# Patient Record
Sex: FEMALE | Race: WHITE | Employment: OTHER | URBAN - NONMETROPOLITAN AREA
[De-identification: names, ages, dates, MRNs, and addresses within clinical notes are randomized per-mention and may not be internally consistent; named-entity substitution may affect disease eponyms.]

---

## 2018-08-15 ENCOUNTER — HOSPITAL ENCOUNTER (OUTPATIENT)
Dept: NURSING | Age: 53
Discharge: HOME | End: 2018-08-15
Payer: MEDICARE

## 2018-08-15 ENCOUNTER — HOSPITAL ENCOUNTER (OUTPATIENT)
Dept: ULTRASOUND IMAGING | Age: 53
Discharge: HOME | End: 2018-08-15
Payer: MEDICARE

## 2018-08-15 VITALS
RESPIRATION RATE: 18 BRPM | DIASTOLIC BLOOD PRESSURE: 60 MMHG | HEART RATE: 89 BPM | OXYGEN SATURATION: 97 % | TEMPERATURE: 98.9 F | SYSTOLIC BLOOD PRESSURE: 126 MMHG

## 2018-08-15 DIAGNOSIS — R18.8 CIRRHOSIS OF LIVER WITH ASCITES, UNSPECIFIED HEPATIC CIRRHOSIS TYPE (HCC): ICD-10-CM

## 2018-08-15 DIAGNOSIS — R18.8 OTHER ASCITES: ICD-10-CM

## 2018-08-15 DIAGNOSIS — K74.60 CIRRHOSIS OF LIVER WITH ASCITES, UNSPECIFIED HEPATIC CIRRHOSIS TYPE (HCC): ICD-10-CM

## 2018-08-15 LAB
ALBUMIN FLUID: 0.8 GM/DL
ALBUMIN SERPL-MCNC: 2.9 G/DL (ref 3.5–5.1)
BODY FLUID RBC: < 2000 /CUMM
CHARACTER, BODY FLUID: NORMAL
COLOR: YELLOW
MONONUCLEAR CELLS BODY FLUID: 69.5 %
PATHOLOGIST REVIEW: NORMAL
POLYMORPHONUCLEAR CELLS BODY FLUID: 30.5 %
PROTEIN FLUID: 1.4 GM/DL
SPECIMEN: NORMAL
TOTAL NUCLEATED CELLS BODY FLUID: 260 /CUMM (ref 0–500)
TOTAL VOLUME RECEIVED BODY FLUID: 80 ML

## 2018-08-15 PROCEDURE — 87075 CULTR BACTERIA EXCEPT BLOOD: CPT

## 2018-08-15 PROCEDURE — 49083 ABD PARACENTESIS W/IMAGING: CPT

## 2018-08-15 PROCEDURE — 6360000002 HC RX W HCPCS: Performed by: INTERNAL MEDICINE

## 2018-08-15 PROCEDURE — 89050 BODY FLUID CELL COUNT: CPT

## 2018-08-15 PROCEDURE — P9046 ALBUMIN (HUMAN), 25%, 20 ML: HCPCS | Performed by: INTERNAL MEDICINE

## 2018-08-15 PROCEDURE — 87205 SMEAR GRAM STAIN: CPT

## 2018-08-15 PROCEDURE — 82042 OTHER SOURCE ALBUMIN QUAN EA: CPT

## 2018-08-15 PROCEDURE — 82040 ASSAY OF SERUM ALBUMIN: CPT

## 2018-08-15 PROCEDURE — 84157 ASSAY OF PROTEIN OTHER: CPT

## 2018-08-15 PROCEDURE — 96365 THER/PROPH/DIAG IV INF INIT: CPT

## 2018-08-15 PROCEDURE — 87070 CULTURE OTHR SPECIMN AEROBIC: CPT

## 2018-08-15 PROCEDURE — 36415 COLL VENOUS BLD VENIPUNCTURE: CPT

## 2018-08-15 RX ORDER — TROSPIUM CHLORIDE 20 MG/1
20 TABLET, FILM COATED ORAL DAILY
Status: ON HOLD | COMMUNITY
End: 2018-09-26

## 2018-08-15 RX ORDER — ALBUMIN (HUMAN) 12.5 G/50ML
25 SOLUTION INTRAVENOUS ONCE
Status: COMPLETED | OUTPATIENT
Start: 2018-08-15 | End: 2018-08-15

## 2018-08-15 RX ORDER — OXYCODONE HYDROCHLORIDE 5 MG/1
5 TABLET ORAL EVERY 8 HOURS PRN
Status: ON HOLD | COMMUNITY
End: 2018-09-26 | Stop reason: ALTCHOICE

## 2018-08-15 RX ORDER — GABAPENTIN 600 MG/1
800 TABLET ORAL 3 TIMES DAILY
Status: ON HOLD | COMMUNITY
End: 2018-09-26

## 2018-08-15 RX ORDER — ONDANSETRON 4 MG/1
4 TABLET, ORALLY DISINTEGRATING ORAL EVERY 8 HOURS PRN
Status: ON HOLD | COMMUNITY
End: 2018-12-07

## 2018-08-15 RX ADMIN — ALBUMIN (HUMAN) 25 G: 0.25 INJECTION, SOLUTION INTRAVENOUS at 10:34

## 2018-08-15 ASSESSMENT — PAIN - FUNCTIONAL ASSESSMENT: PAIN_FUNCTIONAL_ASSESSMENT: 0-10

## 2018-08-15 ASSESSMENT — PAIN DESCRIPTION - DESCRIPTORS: DESCRIPTORS: ACHING;CRAMPING;SHARP

## 2018-08-15 NOTE — PROGRESS NOTES
A Ultrasound guided paracentesis was performed without complication. Please see PACS for full report.

## 2018-08-15 NOTE — PROGRESS NOTES
0748 Patient arrived to [facility] via wheelchair for albumin infusion with zakia bennett in lobby. PT RIGHTS AND RESPONSIBILITIES OFFERED TO PT.  1030 Patient resting quietly at this time  [phone] discharge instructions given to patient verbalized understanding  1200 Lab here for blood draw  1201 Patient discharged to radiology for paracentesis as ordered via wheelchair.      _m___ Safety:       (Environmental)   [place] to environment   Ensure ID band is correct and in place/ allergy band as needed   Assess for fall risk   Initiate fall precautions as applicable (fall band, side rails, etc.)   Call light within reach   Bed in low position/ wheels locked    _m___ Pain:        Assess pain level and characteristics   Administer analgesics as ordered   Assess effectiveness of pain management and report to MD as needed    _m___ Knowledge Deficit:   Assess baseline knowledge   Provide teaching at level of understanding   Provide teaching via preferred learning method   Evaluate teaching effectiveness  m  ____ Hemodynamic/Respiratory Status:       (Pre and Post Procedure Monitoring)   Assess/Monitor vital signs and LOC   Assess Baseline SpO2 prior to any sedation   Obtain weight/height   Assess vital signs/ LOC until patient meets discharge criteria   Monitor procedure site and notify MD of any issues

## 2018-08-20 LAB
ANAEROBIC CULTURE: NORMAL
BODY FLUID CULTURE, STERILE: NORMAL
GRAM STAIN RESULT: NORMAL

## 2018-08-21 ENCOUNTER — HOSPITAL ENCOUNTER (OUTPATIENT)
Dept: NURSING | Age: 53
Discharge: HOME | End: 2018-08-21
Payer: MEDICARE

## 2018-08-21 ENCOUNTER — HOSPITAL ENCOUNTER (OUTPATIENT)
Dept: ULTRASOUND IMAGING | Age: 53
Discharge: HOME | End: 2018-08-21
Payer: MEDICARE

## 2018-08-21 VITALS
OXYGEN SATURATION: 98 % | RESPIRATION RATE: 16 BRPM | DIASTOLIC BLOOD PRESSURE: 66 MMHG | SYSTOLIC BLOOD PRESSURE: 120 MMHG | TEMPERATURE: 96.8 F | HEART RATE: 90 BPM

## 2018-08-21 DIAGNOSIS — R18.8 OTHER ASCITES: ICD-10-CM

## 2018-08-21 DIAGNOSIS — R18.8 CIRRHOSIS OF LIVER WITH ASCITES, UNSPECIFIED HEPATIC CIRRHOSIS TYPE (HCC): ICD-10-CM

## 2018-08-21 DIAGNOSIS — K74.60 CIRRHOSIS OF LIVER WITH ASCITES, UNSPECIFIED HEPATIC CIRRHOSIS TYPE (HCC): ICD-10-CM

## 2018-08-21 LAB
ALBUMIN FLUID: 0.6 GM/DL
PROTEIN FLUID: 1.4 GM/DL

## 2018-08-21 PROCEDURE — 96365 THER/PROPH/DIAG IV INF INIT: CPT

## 2018-08-21 PROCEDURE — 87070 CULTURE OTHR SPECIMN AEROBIC: CPT

## 2018-08-21 PROCEDURE — 84157 ASSAY OF PROTEIN OTHER: CPT

## 2018-08-21 PROCEDURE — P9046 ALBUMIN (HUMAN), 25%, 20 ML: HCPCS | Performed by: INTERNAL MEDICINE

## 2018-08-21 PROCEDURE — 49083 ABD PARACENTESIS W/IMAGING: CPT

## 2018-08-21 PROCEDURE — 89050 BODY FLUID CELL COUNT: CPT

## 2018-08-21 PROCEDURE — 2709999900 HC NON-CHARGEABLE SUPPLY

## 2018-08-21 PROCEDURE — 87205 SMEAR GRAM STAIN: CPT

## 2018-08-21 PROCEDURE — 87075 CULTR BACTERIA EXCEPT BLOOD: CPT

## 2018-08-21 PROCEDURE — 6360000002 HC RX W HCPCS: Performed by: INTERNAL MEDICINE

## 2018-08-21 PROCEDURE — 82042 OTHER SOURCE ALBUMIN QUAN EA: CPT

## 2018-08-21 RX ORDER — ALBUMIN (HUMAN) 12.5 G/50ML
25 SOLUTION INTRAVENOUS ONCE
Status: COMPLETED | OUTPATIENT
Start: 2018-08-21 | End: 2018-08-21

## 2018-08-21 RX ADMIN — ALBUMIN (HUMAN) 25 G: 0.25 INJECTION, SOLUTION INTRAVENOUS at 12:08

## 2018-08-21 NOTE — PROGRESS NOTES
Patient being discharged via wheelchair in stable condition. Written and verbal instructions given to patient, she voices no questions are concerns.

## 2018-08-22 LAB
BODY FLUID RBC: < 2000 /CUMM
CHARACTER, BODY FLUID: NORMAL
COLOR: YELLOW
MESOTHELIAL CELLS BODY FLUID: 3
MONONUCLEAR CELLS BODY FLUID: 86.3 %
PATHOLOGIST REVIEW: NORMAL
POLYMORPHONUCLEAR CELLS BODY FLUID: 10.7 %
SPECIMEN: NORMAL
TOTAL NUCLEATED CELLS BODY FLUID: 151 /CUMM (ref 0–500)
TOTAL VOLUME RECEIVED BODY FLUID: 80 ML

## 2018-08-26 LAB
ANAEROBIC CULTURE: NORMAL
BODY FLUID CULTURE, STERILE: NORMAL
GRAM STAIN RESULT: NORMAL

## 2018-08-28 ENCOUNTER — HOSPITAL ENCOUNTER (OUTPATIENT)
Dept: NURSING | Age: 53
Discharge: HOME | End: 2018-08-28
Payer: MEDICARE

## 2018-08-28 RX ORDER — ALBUMIN (HUMAN) 12.5 G/50ML
25 SOLUTION INTRAVENOUS ONCE
Status: CANCELLED | OUTPATIENT
Start: 2018-08-28 | End: 2018-08-28

## 2018-09-04 ENCOUNTER — HOSPITAL ENCOUNTER (OUTPATIENT)
Dept: NURSING | Age: 53
Discharge: HOME | End: 2018-09-04
Payer: MEDICARE

## 2018-09-04 ENCOUNTER — HOSPITAL ENCOUNTER (OUTPATIENT)
Dept: ULTRASOUND IMAGING | Age: 53
Discharge: HOME | End: 2018-09-04
Payer: MEDICARE

## 2018-09-04 VITALS
RESPIRATION RATE: 18 BRPM | HEART RATE: 90 BPM | SYSTOLIC BLOOD PRESSURE: 128 MMHG | TEMPERATURE: 97.8 F | DIASTOLIC BLOOD PRESSURE: 83 MMHG

## 2018-09-04 DIAGNOSIS — R18.8 CIRRHOSIS OF LIVER WITH ASCITES, UNSPECIFIED HEPATIC CIRRHOSIS TYPE (HCC): ICD-10-CM

## 2018-09-04 DIAGNOSIS — K74.60 CIRRHOSIS OF LIVER WITH ASCITES, UNSPECIFIED HEPATIC CIRRHOSIS TYPE (HCC): ICD-10-CM

## 2018-09-04 LAB
ALBUMIN FLUID: 0.8 GM/DL
BODY FLUID RBC: < 2000 /CUMM
CHARACTER, BODY FLUID: NORMAL
COLOR: YELLOW
MONONUCLEAR CELLS BODY FLUID: 86.5 %
PATHOLOGIST REVIEW: NORMAL
POLYMORPHONUCLEAR CELLS BODY FLUID: 13.5 %
PROTEIN FLUID: 1.5 GM/DL
SPECIMEN: NORMAL
TOTAL NUCLEATED CELLS BODY FLUID: 220 /CUMM (ref 0–500)
TOTAL VOLUME RECEIVED BODY FLUID: 80 ML

## 2018-09-04 PROCEDURE — 96365 THER/PROPH/DIAG IV INF INIT: CPT

## 2018-09-04 PROCEDURE — 87070 CULTURE OTHR SPECIMN AEROBIC: CPT

## 2018-09-04 PROCEDURE — 2709999900 HC NON-CHARGEABLE SUPPLY

## 2018-09-04 PROCEDURE — 87205 SMEAR GRAM STAIN: CPT

## 2018-09-04 PROCEDURE — P9046 ALBUMIN (HUMAN), 25%, 20 ML: HCPCS | Performed by: INTERNAL MEDICINE

## 2018-09-04 PROCEDURE — 87075 CULTR BACTERIA EXCEPT BLOOD: CPT

## 2018-09-04 PROCEDURE — 89050 BODY FLUID CELL COUNT: CPT

## 2018-09-04 PROCEDURE — 84157 ASSAY OF PROTEIN OTHER: CPT

## 2018-09-04 PROCEDURE — 82042 OTHER SOURCE ALBUMIN QUAN EA: CPT

## 2018-09-04 PROCEDURE — 6360000002 HC RX W HCPCS: Performed by: INTERNAL MEDICINE

## 2018-09-04 PROCEDURE — 49083 ABD PARACENTESIS W/IMAGING: CPT

## 2018-09-04 RX ORDER — ALBUMIN (HUMAN) 12.5 G/50ML
25 SOLUTION INTRAVENOUS ONCE
Status: COMPLETED | OUTPATIENT
Start: 2018-09-04 | End: 2018-09-04

## 2018-09-04 RX ADMIN — ALBUMIN (HUMAN) 25 G: 0.25 INJECTION, SOLUTION INTRAVENOUS at 11:21

## 2018-09-04 ASSESSMENT — PAIN SCALES - GENERAL
PAINLEVEL_OUTOF10: 7
PAINLEVEL_OUTOF10: 8

## 2018-09-04 ASSESSMENT — PAIN DESCRIPTION - LOCATION: LOCATION: ABDOMEN

## 2018-09-04 ASSESSMENT — PAIN DESCRIPTION - PAIN TYPE: TYPE: CHRONIC PAIN

## 2018-09-04 NOTE — PROGRESS NOTES
80 Alert female admitted for albumin before paracentesis, procedure reviewed with pt verbalized understanding,Pt rights and responsibilities offered to pt to read. 1200 No complaints voiced. 1218 Infusion complete tolerated well. Home instructions to pt verbalized understanding. [phone] Discharged per wheelchair to main radiology stable with family.     ___met_ Safety:       (Environmental)   [place] to environment   Ensure ID band is correct and in place/ allergy band as needed   Assess for fall risk   Initiate fall precautions as applicable (fall band, side rails, etc.)   Call light within reach   Bed in low position/ wheels locked    __met__ Pain:        Assess pain level and characteristics   Administer analgesics as ordered   Assess effectiveness of pain management and report to MD as needed    __met__ Knowledge Deficit:   Assess baseline knowledge   Provide teaching at level of understanding   Provide teaching via preferred learning method   Evaluate teaching effectiveness    _

## 2018-09-09 LAB
ANAEROBIC CULTURE: NORMAL
BODY FLUID CULTURE, STERILE: NORMAL
GRAM STAIN RESULT: NORMAL

## 2018-09-11 ENCOUNTER — HOSPITAL ENCOUNTER (OUTPATIENT)
Dept: NURSING | Age: 53
Discharge: HOME | End: 2018-09-11
Payer: MEDICARE

## 2018-09-11 ENCOUNTER — HOSPITAL ENCOUNTER (OUTPATIENT)
Dept: ULTRASOUND IMAGING | Age: 53
Discharge: HOME | End: 2018-09-11
Payer: MEDICARE

## 2018-09-11 VITALS
DIASTOLIC BLOOD PRESSURE: 64 MMHG | RESPIRATION RATE: 20 BRPM | OXYGEN SATURATION: 100 % | TEMPERATURE: 98 F | SYSTOLIC BLOOD PRESSURE: 132 MMHG | HEART RATE: 95 BPM

## 2018-09-11 DIAGNOSIS — R18.8 CIRRHOSIS OF LIVER WITH ASCITES, UNSPECIFIED HEPATIC CIRRHOSIS TYPE (HCC): ICD-10-CM

## 2018-09-11 DIAGNOSIS — K74.60 CIRRHOSIS OF LIVER WITH ASCITES, UNSPECIFIED HEPATIC CIRRHOSIS TYPE (HCC): ICD-10-CM

## 2018-09-11 LAB
ALBUMIN FLUID: 0.7 GM/DL
PROTEIN FLUID: 1.4 GM/DL

## 2018-09-11 PROCEDURE — 87070 CULTURE OTHR SPECIMN AEROBIC: CPT

## 2018-09-11 PROCEDURE — P9046 ALBUMIN (HUMAN), 25%, 20 ML: HCPCS | Performed by: INTERNAL MEDICINE

## 2018-09-11 PROCEDURE — 6360000002 HC RX W HCPCS: Performed by: INTERNAL MEDICINE

## 2018-09-11 PROCEDURE — 49083 ABD PARACENTESIS W/IMAGING: CPT

## 2018-09-11 PROCEDURE — 87205 SMEAR GRAM STAIN: CPT

## 2018-09-11 PROCEDURE — 87075 CULTR BACTERIA EXCEPT BLOOD: CPT

## 2018-09-11 PROCEDURE — 96365 THER/PROPH/DIAG IV INF INIT: CPT

## 2018-09-11 PROCEDURE — 82042 OTHER SOURCE ALBUMIN QUAN EA: CPT

## 2018-09-11 PROCEDURE — 84157 ASSAY OF PROTEIN OTHER: CPT

## 2018-09-11 PROCEDURE — 89050 BODY FLUID CELL COUNT: CPT

## 2018-09-11 PROCEDURE — 2709999900 HC NON-CHARGEABLE SUPPLY

## 2018-09-11 RX ORDER — ALBUMIN (HUMAN) 12.5 G/50ML
25 SOLUTION INTRAVENOUS ONCE
Status: COMPLETED | OUTPATIENT
Start: 2018-09-11 | End: 2018-09-11

## 2018-09-11 RX ADMIN — ALBUMIN (HUMAN) 25 G: 0.25 INJECTION, SOLUTION INTRAVENOUS at 11:42

## 2018-09-11 NOTE — PROGRESS NOTES
__M_ Safety:       (Environmental)   [place] to environment   Ensure ID band is correct and in place/ allergy band as needed   Assess for fall risk   Initiate fall precautions as applicable (fall band, side rails, etc.)   Call light within reach   Bed in low position/ wheels locked    ____ Pain:        Assess pain level and characteristics   Administer analgesics as ordered   Assess effectiveness of pain management and report to MD as needed    ____ Knowledge Deficit:   Assess baseline knowledge   Provide teaching at level of understanding   Provide teaching via preferred learning method   Evaluate teaching effectiveness    ____ Hemodynamic/Respiratory Status:       (Pre and Post Procedure Monitoring)   Assess/Monitor vital signs and LOC   Assess Baseline SpO2 prior to any sedation   Obtain weight/height   Assess vital signs/ LOC until patient meets discharge criteria   Monitor procedure site and notify MD of any issues    ____ Infection-Risk of Central Venous Catheter:   Monitor for infection signs and symptoms (catheter site redness, temperature elevation, etc)   Assess for infection risks   Educate regarding infection prevention   Manage central venous catheter (flushes/ dressing changes per protocol)

## 2018-09-11 NOTE — PROGRESS NOTES
1239:  Tolerated infusion well. Pt resting in bed waiting for transport to ultrasound when they are ready. Discharge information given.       _m___ Safety:       (Environmental)   [place] to environment   Ensure ID band is correct and in place/ allergy band as needed   Assess for fall risk   Initiate fall precautions as applicable (fall band, side rails, etc.)   Call light within reach   Bed in low position/ wheels locked    m____ Pain:        Assess pain level and characteristics   Administer analgesics as ordered   Assess effectiveness of pain management and report to MD as needed    _m___ Knowledge Deficit:   Assess baseline knowledge   Provide teaching at level of understanding   Provide teaching via preferred learning method   Evaluate teaching effectiveness    m____ Hemodynamic/Respiratory Status:       (Pre and Post Procedure Monitoring)   Assess/Monitor vital signs and LOC   Assess Baseline SpO2 prior to any sedation   Obtain weight/height   Assess vital signs/ LOC until patient meets discharge criteria   Monitor procedure site and notify MD of any issues    _

## 2018-09-12 LAB
BODY FLUID RBC: 2000 /CUMM
CHARACTER, BODY FLUID: NORMAL
COLOR: YELLOW
MONONUCLEAR CELLS BODY FLUID: 95 %
PATHOLOGIST REVIEW: NORMAL
POLYMORPHONUCLEAR CELLS BODY FLUID: 5 %
SPECIMEN: NORMAL
TOTAL NUCLEATED CELLS BODY FLUID: 165 /CUMM (ref 0–500)
TOTAL VOLUME RECEIVED BODY FLUID: 80 ML

## 2018-09-16 LAB
ANAEROBIC CULTURE: NORMAL
BODY FLUID CULTURE, STERILE: NORMAL
GRAM STAIN RESULT: NORMAL

## 2018-09-18 ENCOUNTER — HOSPITAL ENCOUNTER (OUTPATIENT)
Dept: NURSING | Age: 53
Discharge: HOME | End: 2018-09-18
Payer: COMMERCIAL

## 2018-09-18 ENCOUNTER — HOSPITAL ENCOUNTER (OUTPATIENT)
Dept: ULTRASOUND IMAGING | Age: 53
Discharge: HOME | End: 2018-09-18
Payer: COMMERCIAL

## 2018-09-18 VITALS
HEART RATE: 99 BPM | TEMPERATURE: 98.6 F | OXYGEN SATURATION: 99 % | SYSTOLIC BLOOD PRESSURE: 123 MMHG | RESPIRATION RATE: 18 BRPM | DIASTOLIC BLOOD PRESSURE: 65 MMHG

## 2018-09-18 DIAGNOSIS — K74.60 CIRRHOSIS OF LIVER WITH ASCITES, UNSPECIFIED HEPATIC CIRRHOSIS TYPE (HCC): ICD-10-CM

## 2018-09-18 DIAGNOSIS — R18.8 CIRRHOSIS OF LIVER WITH ASCITES, UNSPECIFIED HEPATIC CIRRHOSIS TYPE (HCC): ICD-10-CM

## 2018-09-18 LAB
ALBUMIN SERPL-MCNC: 2.6 G/DL (ref 3.5–5.1)
BODY FLUID RBC: NORMAL /CUMM
CHARACTER, BODY FLUID: NORMAL
COLOR: NORMAL
MONONUCLEAR CELLS BODY FLUID: 93.4 %
PATHOLOGIST REVIEW: NORMAL
POLYMORPHONUCLEAR CELLS BODY FLUID: 6.6 %
PROTEIN FLUID: 1.4 GM/DL
SPECIMEN: NORMAL
TOTAL NUCLEATED CELLS BODY FLUID: 296 /CUMM (ref 0–500)
TOTAL VOLUME RECEIVED BODY FLUID: 80 ML

## 2018-09-18 PROCEDURE — 2709999900 HC NON-CHARGEABLE SUPPLY

## 2018-09-18 PROCEDURE — 36415 COLL VENOUS BLD VENIPUNCTURE: CPT

## 2018-09-18 PROCEDURE — 87205 SMEAR GRAM STAIN: CPT

## 2018-09-18 PROCEDURE — 2580000003 HC RX 258: Performed by: INTERNAL MEDICINE

## 2018-09-18 PROCEDURE — 49083 ABD PARACENTESIS W/IMAGING: CPT

## 2018-09-18 PROCEDURE — 82042 OTHER SOURCE ALBUMIN QUAN EA: CPT

## 2018-09-18 PROCEDURE — 87075 CULTR BACTERIA EXCEPT BLOOD: CPT

## 2018-09-18 PROCEDURE — 87147 CULTURE TYPE IMMUNOLOGIC: CPT

## 2018-09-18 PROCEDURE — 87077 CULTURE AEROBIC IDENTIFY: CPT

## 2018-09-18 PROCEDURE — 96365 THER/PROPH/DIAG IV INF INIT: CPT

## 2018-09-18 PROCEDURE — 84157 ASSAY OF PROTEIN OTHER: CPT

## 2018-09-18 PROCEDURE — 87186 SC STD MICRODIL/AGAR DIL: CPT

## 2018-09-18 PROCEDURE — P9046 ALBUMIN (HUMAN), 25%, 20 ML: HCPCS | Performed by: INTERNAL MEDICINE

## 2018-09-18 PROCEDURE — 87070 CULTURE OTHR SPECIMN AEROBIC: CPT

## 2018-09-18 PROCEDURE — 89050 BODY FLUID CELL COUNT: CPT

## 2018-09-18 PROCEDURE — 82040 ASSAY OF SERUM ALBUMIN: CPT

## 2018-09-18 PROCEDURE — 6360000002 HC RX W HCPCS: Performed by: INTERNAL MEDICINE

## 2018-09-18 RX ORDER — ALBUMIN (HUMAN) 12.5 G/50ML
25 SOLUTION INTRAVENOUS ONCE
Status: COMPLETED | OUTPATIENT
Start: 2018-09-18 | End: 2018-09-18

## 2018-09-18 RX ORDER — SODIUM CHLORIDE 0.9 % (FLUSH) 0.9 %
10 SYRINGE (ML) INJECTION PRN
Status: DISCONTINUED | OUTPATIENT
Start: 2018-09-18 | End: 2018-09-19 | Stop reason: HOSPADM

## 2018-09-18 RX ADMIN — ALBUMIN (HUMAN) 25 G: 0.25 INJECTION, SOLUTION INTRAVENOUS at 08:58

## 2018-09-18 RX ADMIN — Medication 10 ML: at 08:57

## 2018-09-18 ASSESSMENT — PAIN - FUNCTIONAL ASSESSMENT: PAIN_FUNCTIONAL_ASSESSMENT: 0-10

## 2018-09-18 ASSESSMENT — PAIN DESCRIPTION - DESCRIPTORS: DESCRIPTORS: ACHING

## 2018-09-18 NOTE — PROGRESS NOTES
9913 Patient arrived to [facility] via wheelchair for albumin infusion.   PT RIGHTS AND RESPONSIBILITIES OFFERED TO PT.

## 2018-09-18 NOTE — BRIEF OP NOTE
Post Procedure Progress Note    9/18/2018  Pre-Procedure Diagnosis: Ascites  Post-Procedure Diagnosis: Same  Physician: Christa Castro MD  Anesthesia: 2% lidocaine local  Procedure Performed: Ultrasound guided paracentesis  Specimen Removed: 7.6 liters cloudy dark yellow ascites  Disposition of Specimen: 80 ml specimen sent to the lab  Estimated Blood Loss: None  Complications: None

## 2018-09-18 NOTE — PROGRESS NOTES
0900 bedside report from [facility], [address] [facility] side rail up x1, call light in reach. Denies refreshments  0910 m____ Safety:       (Environmental)   [place] to environment   Ensure ID band is correct and in place/ allergy band as needed   Assess for fall risk   Initiate fall precautions as applicable (fall band, side rails, etc.)   Call light within reach   Bed in low position/ wheels locked    _m___ Pain:        Assess pain level and characteristics   Administer analgesics as ordered   Assess effectiveness of pain management and report to MD as needed    _m___ Knowledge Deficit:   Assess baseline knowledge   Provide teaching at level of understanding   Provide teaching via preferred learning method   Evaluate teaching effectiveness    __m__ Hemodynamic/Respiratory Status:       (Pre and Post Procedure Monitoring)   Assess/Monitor vital signs and LOC   Assess Baseline SpO2 prior to any sedation   Obtain weight/height   Assess vital signs/ LOC until patient meets discharge criteria   Monitor procedure site and notify MD of any issues    0930 resting, denies needs  1000 WRITTEN DISCHARGE INSTRUCTIONS GIVEN TO PT-VERBALIZES UNDERSTANDING  1045 to radiology via wheelchair to paracentesis.  Pt discharged in stable condition

## 2018-09-19 LAB — ALBUMIN FLUID: 0.6 GM/DL

## 2018-09-23 LAB
ANAEROBIC CULTURE: ABNORMAL
BODY FLUID CULTURE, STERILE: ABNORMAL
GRAM STAIN RESULT: ABNORMAL
ORGANISM: ABNORMAL

## 2018-09-25 ENCOUNTER — APPOINTMENT (OUTPATIENT)
Dept: GENERAL RADIOLOGY | Age: 53
DRG: 371 | End: 2018-09-25
Payer: COMMERCIAL

## 2018-09-25 ENCOUNTER — APPOINTMENT (OUTPATIENT)
Dept: CT IMAGING | Age: 53
DRG: 371 | End: 2018-09-25
Payer: COMMERCIAL

## 2018-09-25 ENCOUNTER — APPOINTMENT (OUTPATIENT)
Dept: ULTRASOUND IMAGING | Age: 53
DRG: 371 | End: 2018-09-25
Payer: COMMERCIAL

## 2018-09-25 ENCOUNTER — HOSPITAL ENCOUNTER (OUTPATIENT)
Dept: ULTRASOUND IMAGING | Age: 53
Discharge: HOME | End: 2018-09-25
Payer: COMMERCIAL

## 2018-09-25 ENCOUNTER — HOSPITAL ENCOUNTER (OUTPATIENT)
Dept: NURSING | Age: 53
Discharge: HOME | End: 2018-09-25
Payer: COMMERCIAL

## 2018-09-25 ENCOUNTER — HOSPITAL ENCOUNTER (INPATIENT)
Age: 53
LOS: 8 days | Discharge: HOME HEALTH | DRG: 371 | End: 2018-10-03
Attending: FAMILY MEDICINE | Admitting: INTERNAL MEDICINE
Payer: COMMERCIAL

## 2018-09-25 VITALS
RESPIRATION RATE: 18 BRPM | HEART RATE: 87 BPM | DIASTOLIC BLOOD PRESSURE: 63 MMHG | SYSTOLIC BLOOD PRESSURE: 107 MMHG | TEMPERATURE: 97 F

## 2018-09-25 DIAGNOSIS — K65.2 SBP (SPONTANEOUS BACTERIAL PERITONITIS) (HCC): ICD-10-CM

## 2018-09-25 DIAGNOSIS — N18.9 ACUTE KIDNEY INJURY SUPERIMPOSED ON CHRONIC KIDNEY DISEASE (HCC): ICD-10-CM

## 2018-09-25 DIAGNOSIS — N17.9 ACUTE KIDNEY INJURY SUPERIMPOSED ON CHRONIC KIDNEY DISEASE (HCC): ICD-10-CM

## 2018-09-25 DIAGNOSIS — E87.1 HYPONATREMIA: ICD-10-CM

## 2018-09-25 DIAGNOSIS — K65.2 SPONTANEOUS BACTERIAL PERITONITIS (HCC): Primary | ICD-10-CM

## 2018-09-25 DIAGNOSIS — K74.69 CIRRHOSIS, CRYPTOGENIC (HCC): ICD-10-CM

## 2018-09-25 DIAGNOSIS — R18.8 OTHER ASCITES: ICD-10-CM

## 2018-09-25 LAB
ALBUMIN FLUID: 0.7 GM/DL
ALBUMIN SERPL-MCNC: 2.5 G/DL (ref 3.5–5.1)
ALBUMIN SERPL-MCNC: 2.9 G/DL (ref 3.5–5.1)
ALP BLD-CCNC: 315 U/L (ref 38–126)
ALT SERPL-CCNC: 10 U/L (ref 11–66)
ANION GAP SERPL CALCULATED.3IONS-SCNC: 21 MEQ/L (ref 8–16)
APTT: 36.7 SECONDS (ref 22–38)
AST SERPL-CCNC: 12 U/L (ref 5–40)
BASOPHILS # BLD: 0.2 %
BASOPHILS ABSOLUTE: 0 THOU/MM3 (ref 0–0.1)
BILIRUB SERPL-MCNC: 0.8 MG/DL (ref 0.3–1.2)
BODY FLUID RBC: NORMAL /CUMM
BUN BLDV-MCNC: 56 MG/DL (ref 7–22)
CALCIUM SERPL-MCNC: 8.9 MG/DL (ref 8.5–10.5)
CHARACTER, BODY FLUID: NORMAL
CHLORIDE BLD-SCNC: 92 MEQ/L (ref 98–111)
CO2: 14 MEQ/L (ref 23–33)
COLOR: NORMAL
CREAT SERPL-MCNC: 3.5 MG/DL (ref 0.4–1.2)
EKG ATRIAL RATE: 92 BPM
EKG P AXIS: 47 DEGREES
EKG P-R INTERVAL: 150 MS
EKG Q-T INTERVAL: 386 MS
EKG QRS DURATION: 90 MS
EKG QTC CALCULATION (BAZETT): 477 MS
EKG R AXIS: -7 DEGREES
EKG T AXIS: 68 DEGREES
EKG VENTRICULAR RATE: 92 BPM
EOSINOPHIL # BLD: 0.7 %
EOSINOPHILS ABSOLUTE: 0.1 THOU/MM3 (ref 0–0.4)
ERYTHROCYTE [DISTWIDTH] IN BLOOD BY AUTOMATED COUNT: 18.6 % (ref 11.5–14.5)
ERYTHROCYTE [DISTWIDTH] IN BLOOD BY AUTOMATED COUNT: 57.4 FL (ref 35–45)
GFR SERPL CREATININE-BSD FRML MDRD: 14 ML/MIN/1.73M2
GLUCOSE BLD-MCNC: 130 MG/DL (ref 70–108)
HCT VFR BLD CALC: 39.3 % (ref 37–47)
HEMOGLOBIN: 12 GM/DL (ref 12–16)
IMMATURE GRANS (ABS): 0.03 THOU/MM3 (ref 0–0.07)
IMMATURE GRANULOCYTES: 0.3 %
INR BLD: 1.23 (ref 0.85–1.13)
LACTIC ACID: 2.8 MMOL/L (ref 0.5–2.2)
LIPASE: 74.6 U/L (ref 5.6–51.3)
LYMPHOCYTES # BLD: 12.7 %
LYMPHOCYTES ABSOLUTE: 1.2 THOU/MM3 (ref 1–4.8)
MCH RBC QN AUTO: 26.9 PG (ref 26–33)
MCHC RBC AUTO-ENTMCNC: 30.5 GM/DL (ref 32.2–35.5)
MCV RBC AUTO: 88.1 FL (ref 81–99)
MONOCYTES # BLD: 5.3 %
MONOCYTES ABSOLUTE: 0.5 THOU/MM3 (ref 0.4–1.3)
MONONUCLEAR CELLS BODY FLUID: 75.7 %
NUCLEATED RED BLOOD CELLS: 0 /100 WBC
OSMOLALITY CALCULATION: 272.4 MOSMOL/KG (ref 275–300)
PATHOLOGIST REVIEW: NORMAL
PLATELET # BLD: 289 THOU/MM3 (ref 130–400)
PMV BLD AUTO: 8.9 FL (ref 9.4–12.4)
POLYMORPHONUCLEAR CELLS BODY FLUID: 24.3 %
POTASSIUM REFLEX MAGNESIUM: 3.8 MEQ/L (ref 3.5–5.2)
PRO-BNP: ABNORMAL PG/ML (ref 0–900)
PROTEIN FLUID: 1.6 GM/DL
RBC # BLD: 4.46 MILL/MM3 (ref 4.2–5.4)
SEG NEUTROPHILS: 80.8 %
SEGMENTED NEUTROPHILS ABSOLUTE COUNT: 7.6 THOU/MM3 (ref 1.8–7.7)
SODIUM BLD-SCNC: 127 MEQ/L (ref 135–145)
SPECIMEN: NORMAL
TOTAL NUCLEATED CELLS BODY FLUID: 467 /CUMM (ref 0–500)
TOTAL PROTEIN: 6.7 G/DL (ref 6.1–8)
TOTAL VOLUME RECEIVED BODY FLUID: 80 ML
TROPONIN T: < 0.01 NG/ML
WBC # BLD: 9.4 THOU/MM3 (ref 4.8–10.8)

## 2018-09-25 PROCEDURE — 80053 COMPREHEN METABOLIC PANEL: CPT

## 2018-09-25 PROCEDURE — 96365 THER/PROPH/DIAG IV INF INIT: CPT

## 2018-09-25 PROCEDURE — 89050 BODY FLUID CELL COUNT: CPT

## 2018-09-25 PROCEDURE — 6360000002 HC RX W HCPCS: Performed by: FAMILY MEDICINE

## 2018-09-25 PROCEDURE — 84157 ASSAY OF PROTEIN OTHER: CPT

## 2018-09-25 PROCEDURE — 2709999900 HC NON-CHARGEABLE SUPPLY

## 2018-09-25 PROCEDURE — 99285 EMERGENCY DEPT VISIT HI MDM: CPT

## 2018-09-25 PROCEDURE — P9046 ALBUMIN (HUMAN), 25%, 20 ML: HCPCS | Performed by: INTERNAL MEDICINE

## 2018-09-25 PROCEDURE — 84484 ASSAY OF TROPONIN QUANT: CPT

## 2018-09-25 PROCEDURE — 87205 SMEAR GRAM STAIN: CPT

## 2018-09-25 PROCEDURE — 76770 US EXAM ABDO BACK WALL COMP: CPT

## 2018-09-25 PROCEDURE — 83690 ASSAY OF LIPASE: CPT

## 2018-09-25 PROCEDURE — 49083 ABD PARACENTESIS W/IMAGING: CPT

## 2018-09-25 PROCEDURE — 6360000002 HC RX W HCPCS: Performed by: INTERNAL MEDICINE

## 2018-09-25 PROCEDURE — 87040 BLOOD CULTURE FOR BACTERIA: CPT

## 2018-09-25 PROCEDURE — 1200000003 HC TELEMETRY R&B

## 2018-09-25 PROCEDURE — 85730 THROMBOPLASTIN TIME PARTIAL: CPT

## 2018-09-25 PROCEDURE — 74176 CT ABD & PELVIS W/O CONTRAST: CPT

## 2018-09-25 PROCEDURE — 2500000003 HC RX 250 WO HCPCS: Performed by: INTERNAL MEDICINE

## 2018-09-25 PROCEDURE — 87086 URINE CULTURE/COLONY COUNT: CPT

## 2018-09-25 PROCEDURE — 87070 CULTURE OTHR SPECIMN AEROBIC: CPT

## 2018-09-25 PROCEDURE — 2580000003 HC RX 258: Performed by: INTERNAL MEDICINE

## 2018-09-25 PROCEDURE — 93010 ELECTROCARDIOGRAM REPORT: CPT | Performed by: INTERNAL MEDICINE

## 2018-09-25 PROCEDURE — 82042 OTHER SOURCE ALBUMIN QUAN EA: CPT

## 2018-09-25 PROCEDURE — 85610 PROTHROMBIN TIME: CPT

## 2018-09-25 PROCEDURE — 96366 THER/PROPH/DIAG IV INF ADDON: CPT

## 2018-09-25 PROCEDURE — 83880 ASSAY OF NATRIURETIC PEPTIDE: CPT

## 2018-09-25 PROCEDURE — 93005 ELECTROCARDIOGRAM TRACING: CPT | Performed by: FAMILY MEDICINE

## 2018-09-25 PROCEDURE — 6370000000 HC RX 637 (ALT 250 FOR IP): Performed by: INTERNAL MEDICINE

## 2018-09-25 PROCEDURE — 85025 COMPLETE CBC W/AUTO DIFF WBC: CPT

## 2018-09-25 PROCEDURE — 36415 COLL VENOUS BLD VENIPUNCTURE: CPT

## 2018-09-25 PROCEDURE — 96375 TX/PRO/DX INJ NEW DRUG ADDON: CPT

## 2018-09-25 PROCEDURE — 82040 ASSAY OF SERUM ALBUMIN: CPT

## 2018-09-25 PROCEDURE — 71046 X-RAY EXAM CHEST 2 VIEWS: CPT

## 2018-09-25 PROCEDURE — 87075 CULTR BACTERIA EXCEPT BLOOD: CPT

## 2018-09-25 PROCEDURE — 2580000003 HC RX 258: Performed by: FAMILY MEDICINE

## 2018-09-25 PROCEDURE — 83605 ASSAY OF LACTIC ACID: CPT

## 2018-09-25 RX ORDER — ONDANSETRON 4 MG/1
4 TABLET, ORALLY DISINTEGRATING ORAL EVERY 8 HOURS PRN
Status: DISCONTINUED | OUTPATIENT
Start: 2018-09-25 | End: 2018-10-04 | Stop reason: HOSPADM

## 2018-09-25 RX ORDER — PANTOPRAZOLE SODIUM 40 MG/10ML
40 INJECTION, POWDER, LYOPHILIZED, FOR SOLUTION INTRAVENOUS 2 TIMES DAILY
Status: DISCONTINUED | OUTPATIENT
Start: 2018-09-25 | End: 2018-09-29 | Stop reason: SDUPTHER

## 2018-09-25 RX ORDER — DOCUSATE SODIUM 100 MG/1
100 CAPSULE, LIQUID FILLED ORAL 2 TIMES DAILY
Status: DISCONTINUED | OUTPATIENT
Start: 2018-09-25 | End: 2018-10-04 | Stop reason: HOSPADM

## 2018-09-25 RX ORDER — SODIUM CHLORIDE 0.9 % (FLUSH) 0.9 %
10 SYRINGE (ML) INJECTION EVERY 12 HOURS SCHEDULED
Status: DISCONTINUED | OUTPATIENT
Start: 2018-09-25 | End: 2018-10-02 | Stop reason: SDUPTHER

## 2018-09-25 RX ORDER — ONDANSETRON 2 MG/ML
4 INJECTION INTRAMUSCULAR; INTRAVENOUS EVERY 6 HOURS PRN
Status: DISCONTINUED | OUTPATIENT
Start: 2018-09-25 | End: 2018-10-04 | Stop reason: HOSPADM

## 2018-09-25 RX ORDER — 0.9 % SODIUM CHLORIDE 0.9 %
250 INTRAVENOUS SOLUTION INTRAVENOUS ONCE
Status: COMPLETED | OUTPATIENT
Start: 2018-09-25 | End: 2018-09-25

## 2018-09-25 RX ORDER — HEPARIN SODIUM 5000 [USP'U]/ML
5000 INJECTION, SOLUTION INTRAVENOUS; SUBCUTANEOUS EVERY 8 HOURS
Status: DISCONTINUED | OUTPATIENT
Start: 2018-09-26 | End: 2018-10-04 | Stop reason: HOSPADM

## 2018-09-25 RX ORDER — ALBUMIN (HUMAN) 12.5 G/50ML
25 SOLUTION INTRAVENOUS ONCE
Status: COMPLETED | OUTPATIENT
Start: 2018-09-25 | End: 2018-09-25

## 2018-09-25 RX ORDER — LEVOFLOXACIN 5 MG/ML
750 INJECTION, SOLUTION INTRAVENOUS ONCE
Status: COMPLETED | OUTPATIENT
Start: 2018-09-25 | End: 2018-09-25

## 2018-09-25 RX ORDER — SODIUM CHLORIDE 0.9 % (FLUSH) 0.9 %
10 SYRINGE (ML) INJECTION PRN
Status: DISCONTINUED | OUTPATIENT
Start: 2018-09-25 | End: 2018-10-02 | Stop reason: SDUPTHER

## 2018-09-25 RX ORDER — OXYCODONE HYDROCHLORIDE 5 MG/1
5 TABLET ORAL EVERY 6 HOURS PRN
Status: DISCONTINUED | OUTPATIENT
Start: 2018-09-25 | End: 2018-10-04 | Stop reason: HOSPADM

## 2018-09-25 RX ORDER — FENTANYL CITRATE 50 UG/ML
50 INJECTION, SOLUTION INTRAMUSCULAR; INTRAVENOUS
Status: DISCONTINUED | OUTPATIENT
Start: 2018-09-25 | End: 2018-09-25 | Stop reason: HOSPADM

## 2018-09-25 RX ORDER — SODIUM CHLORIDE 9 MG/ML
INJECTION, SOLUTION INTRAVENOUS CONTINUOUS
Status: CANCELLED | OUTPATIENT
Start: 2018-09-25

## 2018-09-25 RX ADMIN — OXYCODONE HYDROCHLORIDE 5 MG: 5 TABLET ORAL at 22:53

## 2018-09-25 RX ADMIN — SODIUM CHLORIDE 250 ML: 9 INJECTION, SOLUTION INTRAVENOUS at 18:12

## 2018-09-25 RX ADMIN — ALBUMIN (HUMAN) 25 G: 0.25 INJECTION, SOLUTION INTRAVENOUS at 09:47

## 2018-09-25 RX ADMIN — LEVOFLOXACIN 750 MG: 5 INJECTION, SOLUTION INTRAVENOUS at 20:43

## 2018-09-25 RX ADMIN — VANCOMYCIN HYDROCHLORIDE 1000 MG: 1 INJECTION, POWDER, LYOPHILIZED, FOR SOLUTION INTRAVENOUS at 18:14

## 2018-09-25 RX ADMIN — CEFTRIAXONE SODIUM 2 G: 2 INJECTION, POWDER, FOR SOLUTION INTRAMUSCULAR; INTRAVENOUS at 23:29

## 2018-09-25 RX ADMIN — SODIUM BICARBONATE: 84 INJECTION, SOLUTION INTRAVENOUS at 22:51

## 2018-09-25 RX ADMIN — FENTANYL CITRATE 50 MCG: 50 INJECTION INTRAMUSCULAR; INTRAVENOUS at 19:29

## 2018-09-25 ASSESSMENT — ENCOUNTER SYMPTOMS
ABDOMINAL PAIN: 1
CHEST TIGHTNESS: 0
WHEEZING: 0
VOICE CHANGE: 0
VOMITING: 0
TROUBLE SWALLOWING: 0
RESPIRATORY NEGATIVE: 1
BACK PAIN: 1
RHINORRHEA: 0
NAUSEA: 0
COUGH: 0

## 2018-09-25 ASSESSMENT — PAIN DESCRIPTION - LOCATION
LOCATION: ABDOMEN
LOCATION: ABDOMEN

## 2018-09-25 ASSESSMENT — PAIN DESCRIPTION - PAIN TYPE
TYPE: CHRONIC PAIN

## 2018-09-25 ASSESSMENT — PAIN SCALES - GENERAL
PAINLEVEL_OUTOF10: 6
PAINLEVEL_OUTOF10: 5
PAINLEVEL_OUTOF10: 10
PAINLEVEL_OUTOF10: 6
PAINLEVEL_OUTOF10: 7

## 2018-09-25 ASSESSMENT — PAIN DESCRIPTION - DESCRIPTORS: DESCRIPTORS: ACHING;PRESSURE

## 2018-09-25 ASSESSMENT — PAIN DESCRIPTION - FREQUENCY: FREQUENCY: CONTINUOUS

## 2018-09-25 NOTE — PROGRESS NOTES
0915:  Here for iv albumin and lab. Arrives via wc.  0920:  Lab here  1036: Tolerated infusion well. Ultrasound notified pt is ready for paracentesis.    1050:  TO ULTRASOUND PER WC.    _M___ Safety:       (Environmental)   [place] to environment   Ensure ID band is correct and in place/ allergy band as needed   Assess for fall risk   Initiate fall precautions as applicable (fall band, side rails, etc.)   Call light within reach   Bed in low position/ wheels locked    _M___ Pain:        Assess pain level and characteristics   Administer analgesics as ordered   Assess effectiveness of pain management and report to MD as needed    M____ Knowledge Deficit:   Assess baseline knowledge   Provide teaching at level of understanding   Provide teaching via preferred learning method   Evaluate teaching effectiveness    _M___ Hemodynamic/Respiratory Status:       (Pre and Post Procedure Monitoring)   Assess/Monitor vital signs and LOC   Assess Baseline SpO2 prior to any sedation   Obtain weight/height   Assess vital signs/ LOC until patient meets discharge criteria   Monitor procedure site and notify MD of any issues    _

## 2018-09-25 NOTE — ED PROVIDER NOTES
Negative for congestion, rhinorrhea, trouble swallowing and voice change. Respiratory: Negative. Negative for cough, chest tightness and wheezing. Cardiovascular: Negative. Negative for chest pain, palpitations and leg swelling. Gastrointestinal: Positive for abdominal pain. Negative for nausea and vomiting. Genitourinary: Negative. Musculoskeletal: Positive for back pain. Negative for neck pain and neck stiffness. Skin: Negative. All other systems reviewed and are negative. PAST MEDICAL HISTORY    has a past medical history of Arthritis; CAD (coronary artery disease); Diabetes mellitus ([facility] [address] and Hyperlipidemia. SURGICAL HISTORY      has a past surgical history that includes hernia repair; Coronary angioplasty with stent; Elbow surgery (Right); and Cholecystectomy. CURRENT MEDICATIONS       Previous Medications    GABAPENTIN (NEURONTIN) 600 MG TABLET    Take 800 mg by mouth 3 times daily. Junius Windy METFORMIN (GLUCOPHAGE) 500 MG TABLET    Take 500 mg by mouth 3 times daily    ONDANSETRON (ZOFRAN-ODT) 4 MG DISINTEGRATING TABLET    Take 4 mg by mouth every 8 hours as needed for Nausea or Vomiting    OXYCODONE (ROXICODONE) 5 MG IMMEDIATE RELEASE TABLET    Take 5 mg by mouth every 8 hours as needed for Pain. 1-2 tablets . TROSPIUM (SANCTURA) 20 MG TABLET    Take 20 mg by mouth daily       ALLERGIES     is allergic to morphine and pcn [penicillins]. FAMILY HISTORY     indicated that the status of her mother is unknown. She indicated that the status of her father is unknown.    family history includes Cancer in her mother; Heart Disease in her father. SOCIAL HISTORY      reports that she has quit smoking. She has never used smokeless tobacco. She reports that she does not drink alcohol. PHYSICAL EXAM     INITIAL VITALS:  oral temperature is 98.4 °F (36.9 °C). Her blood pressure is 117/73 and her pulse is 100. Her respiration is 17 and oxygen saturation is 98%.     Physical Exam

## 2018-09-25 NOTE — PROGRESS NOTES
Formulation and discussion of sedation / procedure plans, risks, benefits, side effects and alternatives with patient and/or responsible adult completed.     Electronically signed by Darion Knott MD on 9/25/2018 at 12:52 PM

## 2018-09-25 NOTE — ED NOTES
Patient in ed room 8. Patient complain of abdominal pain, vital signs obtained and assessment completed.      Yuliana Hackett RN  09/25/18 [id number]

## 2018-09-25 NOTE — ED NOTES
Patient medicated per order. Lights turned down per request. Patient request door remain open. Denies warm blankets.       Salima Lofton RN  09/25/18 1935

## 2018-09-26 LAB
AMMONIA: 125 UMOL/L (ref 11–60)
AMORPHOUS: ABNORMAL
ANION GAP SERPL CALCULATED.3IONS-SCNC: 18 MEQ/L (ref 8–16)
BACTERIA: ABNORMAL /HPF
BILIRUBIN URINE: ABNORMAL
BLOOD, URINE: NEGATIVE
BUN BLDV-MCNC: 56 MG/DL (ref 7–22)
CALCIUM SERPL-MCNC: 7.5 MG/DL (ref 8.5–10.5)
CASTS 2: ABNORMAL /LPF
CASTS UA: ABNORMAL /LPF
CHARACTER, URINE: ABNORMAL
CHLORIDE BLD-SCNC: 90 MEQ/L (ref 98–111)
CHLORIDE, URINE: < 20 MEQ/L
CO2: 14 MEQ/L (ref 23–33)
COLOR: YELLOW
CREAT SERPL-MCNC: 3.5 MG/DL (ref 0.4–1.2)
CREATININE URINE: 117.7 MG/DL
CRYSTALS, UA: ABNORMAL
EOSINOPHIL SMEAR: NORMAL
EPITHELIAL CELLS, UA: ABNORMAL /HPF
ERYTHROCYTE [DISTWIDTH] IN BLOOD BY AUTOMATED COUNT: 18.3 % (ref 11.5–14.5)
ERYTHROCYTE [DISTWIDTH] IN BLOOD BY AUTOMATED COUNT: 57 FL (ref 35–45)
GFR SERPL CREATININE-BSD FRML MDRD: 14 ML/MIN/1.73M2
GLUCOSE BLD-MCNC: 150 MG/DL (ref 70–108)
GLUCOSE BLD-MCNC: 183 MG/DL (ref 70–108)
GLUCOSE BLD-MCNC: 200 MG/DL (ref 70–108)
GLUCOSE URINE: NEGATIVE MG/DL
HCT VFR BLD CALC: 34.5 % (ref 37–47)
HEMOGLOBIN: 10.6 GM/DL (ref 12–16)
ICTOTEST: NEGATIVE
KETONES, URINE: ABNORMAL
LACTIC ACID: 2.3 MMOL/L (ref 0.5–2.2)
LD: 127 U/L (ref 100–190)
LEUKOCYTE ESTERASE, URINE: ABNORMAL
LV EF: 55 %
LVEF MODALITY: NORMAL
MCH RBC QN AUTO: 26.9 PG (ref 26–33)
MCHC RBC AUTO-ENTMCNC: 30.7 GM/DL (ref 32.2–35.5)
MCV RBC AUTO: 87.6 FL (ref 81–99)
MISCELLANEOUS 2: ABNORMAL
MYOGLOBIN URINE: NEGATIVE
NITRITE, URINE: NEGATIVE
OSMOLALITY URINE: 302 MOSMOL/KG (ref 250–750)
OSMOLALITY: 282 MOSMOL/KG (ref 275–295)
PH UA: 5
PLATELET # BLD: 211 THOU/MM3 (ref 130–400)
PMV BLD AUTO: 9.5 FL (ref 9.4–12.4)
POTASSIUM SERPL-SCNC: 4 MEQ/L (ref 3.5–5.2)
POTASSIUM, URINE: 40.8 MEQ/L
PROTEIN UA: 30
PROTEIN, URINE: 55.9 MG/DL
RBC # BLD: 3.94 MILL/MM3 (ref 4.2–5.4)
RBC URINE: ABNORMAL /HPF
RENAL EPITHELIAL, UA: ABNORMAL
SODIUM BLD-SCNC: 122 MEQ/L (ref 135–145)
SODIUM URINE: < 20 MEQ/L
SODIUM URINE: < 20 MEQ/L
SPECIFIC GRAVITY, URINE: 1.02 (ref 1–1.03)
SPECIMEN: NORMAL
URIC ACID: 8.4 MG/DL (ref 2.4–5.7)
UROBILINOGEN, URINE: 0.2 EU/DL
VANCOMYCIN TROUGH: 12.8 UG/ML (ref 5–15)
WBC # BLD: 11 THOU/MM3 (ref 4.8–10.8)
WBC UA: ABNORMAL /HPF
YEAST: ABNORMAL

## 2018-09-26 PROCEDURE — 82948 REAGENT STRIP/BLOOD GLUCOSE: CPT

## 2018-09-26 PROCEDURE — 83935 ASSAY OF URINE OSMOLALITY: CPT

## 2018-09-26 PROCEDURE — 2580000003 HC RX 258

## 2018-09-26 PROCEDURE — 6370000000 HC RX 637 (ALT 250 FOR IP): Performed by: INTERNAL MEDICINE

## 2018-09-26 PROCEDURE — 84133 ASSAY OF URINE POTASSIUM: CPT

## 2018-09-26 PROCEDURE — 84300 ASSAY OF URINE SODIUM: CPT

## 2018-09-26 PROCEDURE — 83605 ASSAY OF LACTIC ACID: CPT

## 2018-09-26 PROCEDURE — 93306 TTE W/DOPPLER COMPLETE: CPT

## 2018-09-26 PROCEDURE — 81001 URINALYSIS AUTO W/SCOPE: CPT

## 2018-09-26 PROCEDURE — 83615 LACTATE (LD) (LDH) ENZYME: CPT

## 2018-09-26 PROCEDURE — 1200000003 HC TELEMETRY R&B

## 2018-09-26 PROCEDURE — 36415 COLL VENOUS BLD VENIPUNCTURE: CPT

## 2018-09-26 PROCEDURE — 85027 COMPLETE CBC AUTOMATED: CPT

## 2018-09-26 PROCEDURE — 2580000003 HC RX 258: Performed by: INTERNAL MEDICINE

## 2018-09-26 PROCEDURE — 84550 ASSAY OF BLOOD/URIC ACID: CPT

## 2018-09-26 PROCEDURE — 82140 ASSAY OF AMMONIA: CPT

## 2018-09-26 PROCEDURE — 80202 ASSAY OF VANCOMYCIN: CPT

## 2018-09-26 PROCEDURE — C9113 INJ PANTOPRAZOLE SODIUM, VIA: HCPCS | Performed by: INTERNAL MEDICINE

## 2018-09-26 PROCEDURE — 83874 ASSAY OF MYOGLOBIN: CPT

## 2018-09-26 PROCEDURE — 84156 ASSAY OF PROTEIN URINE: CPT

## 2018-09-26 PROCEDURE — 82570 ASSAY OF URINE CREATININE: CPT

## 2018-09-26 PROCEDURE — 6360000002 HC RX W HCPCS: Performed by: INTERNAL MEDICINE

## 2018-09-26 PROCEDURE — 2500000003 HC RX 250 WO HCPCS: Performed by: INTERNAL MEDICINE

## 2018-09-26 PROCEDURE — 82436 ASSAY OF URINE CHLORIDE: CPT

## 2018-09-26 PROCEDURE — 80048 BASIC METABOLIC PNL TOTAL CA: CPT

## 2018-09-26 PROCEDURE — 89190 NASAL SMEAR FOR EOSINOPHILS: CPT

## 2018-09-26 PROCEDURE — 99221 1ST HOSP IP/OBS SF/LOW 40: CPT | Performed by: INTERNAL MEDICINE

## 2018-09-26 PROCEDURE — 6360000002 HC RX W HCPCS

## 2018-09-26 PROCEDURE — 83930 ASSAY OF BLOOD OSMOLALITY: CPT

## 2018-09-26 RX ORDER — TRAZODONE HYDROCHLORIDE 50 MG/1
50-150 TABLET ORAL NIGHTLY PRN
COMMUNITY

## 2018-09-26 RX ORDER — NICOTINE POLACRILEX 4 MG
15 LOZENGE BUCCAL PRN
Status: DISCONTINUED | OUTPATIENT
Start: 2018-09-26 | End: 2018-10-04 | Stop reason: HOSPADM

## 2018-09-26 RX ORDER — OMEPRAZOLE 40 MG/1
40 CAPSULE, DELAYED RELEASE ORAL 3 TIMES DAILY
COMMUNITY

## 2018-09-26 RX ORDER — DEXTROSE MONOHYDRATE 50 MG/ML
100 INJECTION, SOLUTION INTRAVENOUS PRN
Status: DISCONTINUED | OUTPATIENT
Start: 2018-09-26 | End: 2018-10-04 | Stop reason: HOSPADM

## 2018-09-26 RX ORDER — SODIUM CHLORIDE 9 MG/ML
INJECTION, SOLUTION INTRAVENOUS CONTINUOUS
Status: DISCONTINUED | OUTPATIENT
Start: 2018-09-26 | End: 2018-09-26

## 2018-09-26 RX ORDER — LACTULOSE 10 G/15ML
20 SOLUTION ORAL 3 TIMES DAILY
Status: DISCONTINUED | OUTPATIENT
Start: 2018-09-26 | End: 2018-09-28

## 2018-09-26 RX ORDER — DEXTROSE MONOHYDRATE 25 G/50ML
12.5 INJECTION, SOLUTION INTRAVENOUS PRN
Status: DISCONTINUED | OUTPATIENT
Start: 2018-09-26 | End: 2018-10-04 | Stop reason: HOSPADM

## 2018-09-26 RX ORDER — GABAPENTIN 800 MG/1
800 TABLET ORAL 3 TIMES DAILY
Status: ON HOLD | COMMUNITY
End: 2018-12-07

## 2018-09-26 RX ADMIN — PANTOPRAZOLE SODIUM 40 MG: 40 INJECTION, POWDER, FOR SOLUTION INTRAVENOUS at 00:31

## 2018-09-26 RX ADMIN — PANTOPRAZOLE SODIUM 40 MG: 40 INJECTION, POWDER, FOR SOLUTION INTRAVENOUS at 20:06

## 2018-09-26 RX ADMIN — Medication 1 UNITS: at 17:12

## 2018-09-26 RX ADMIN — HEPARIN SODIUM 5000 UNITS: 5000 INJECTION INTRAVENOUS; SUBCUTANEOUS at 14:54

## 2018-09-26 RX ADMIN — SODIUM BICARBONATE: 84 INJECTION, SOLUTION INTRAVENOUS at 09:07

## 2018-09-26 RX ADMIN — HEPARIN SODIUM 5000 UNITS: 5000 INJECTION INTRAVENOUS; SUBCUTANEOUS at 08:10

## 2018-09-26 RX ADMIN — PANTOPRAZOLE SODIUM 40 MG: 40 INJECTION, POWDER, FOR SOLUTION INTRAVENOUS at 08:10

## 2018-09-26 RX ADMIN — ONDANSETRON 4 MG: 4 TABLET, ORALLY DISINTEGRATING ORAL at 18:13

## 2018-09-26 RX ADMIN — VANCOMYCIN HYDROCHLORIDE 1000 MG: 1 INJECTION, POWDER, LYOPHILIZED, FOR SOLUTION INTRAVENOUS at 22:06

## 2018-09-26 RX ADMIN — OXYCODONE HYDROCHLORIDE 5 MG: 5 TABLET ORAL at 06:46

## 2018-09-26 RX ADMIN — OXYCODONE HYDROCHLORIDE 5 MG: 5 TABLET ORAL at 12:57

## 2018-09-26 RX ADMIN — SODIUM BICARBONATE 50 MEQ: 84 INJECTION, SOLUTION INTRAVENOUS at 12:00

## 2018-09-26 RX ADMIN — HEPARIN SODIUM 5000 UNITS: 5000 INJECTION INTRAVENOUS; SUBCUTANEOUS at 00:31

## 2018-09-26 RX ADMIN — SODIUM BICARBONATE: 84 INJECTION, SOLUTION INTRAVENOUS at 15:33

## 2018-09-26 RX ADMIN — OXYCODONE HYDROCHLORIDE 5 MG: 5 TABLET ORAL at 20:06

## 2018-09-26 RX ADMIN — SODIUM CHLORIDE: 9 INJECTION, SOLUTION INTRAVENOUS at 12:00

## 2018-09-26 ASSESSMENT — PAIN SCALES - GENERAL
PAINLEVEL_OUTOF10: 8
PAINLEVEL_OUTOF10: 7
PAINLEVEL_OUTOF10: 6
PAINLEVEL_OUTOF10: 4
PAINLEVEL_OUTOF10: 3
PAINLEVEL_OUTOF10: 0
PAINLEVEL_OUTOF10: 7
PAINLEVEL_OUTOF10: 7
PAINLEVEL_OUTOF10: 5

## 2018-09-26 NOTE — PROGRESS NOTES
Pt admitted to  6K16 from ED and via cart/stretcher. Complaints: MRSA in ascites fluid. IV normal saline infusing into the forearm right, condition patent and no redness at a rate of 0 mls/ hour with about 0 mls in the bag still. IV site free of s/s of infection or infiltration. Vital signs obtained. Assessment and data collection initiated. Two nurse skin assessment performed by Ni Penny and Luiza Latyon. Oriented to room. Policies and procedures for  explained. All questions answered with no further questions at this time. Fall prevention and safety brochure discussed with patient. Bed alarm on. Call light in reach. The best day to schedule a follow up Dr appointment is:  Tuesday p.m.

## 2018-09-26 NOTE — PLAN OF CARE
Problem: Pain:  Goal: Pain level will decrease  Pain level will decrease   Outcome: Ongoing  Pain rated 7/10 in abdomen, see MAR, will continue to monitor     Problem: Falls - Risk of:  Goal: Will remain free from falls  Will remain free from falls   Outcome: Ongoing  No falls noted this shift. Continue falling star program. Bed alarm on, bed in low position. Call light and personal belongings in reach. Patient uses call light appropriately. Goal: Absence of physical injury  Absence of physical injury   Outcome: Ongoing  No falls noted this shift. Continue falling star program. Bed alarm on, bed in low position. Call light and personal belongings in reach. Patient uses call light appropriately. Problem: Nutrition  Goal: Optimal nutrition therapy  Outcome: Ongoing  Patient receiving oral supplements with meals     Problem: Discharge Planning:  Goal: Discharged to appropriate level of care  Discharged to appropriate level of care  Outcome: Ongoing  Plan to discharge to home with support    Problem: Daily Care:  Goal: Daily care needs are met  Daily care needs are met  Outcome: Ongoing  Purposefully hourly rounding     Comments: Care plan reviewed with patient. Patient verbalizes understanding of the plan of care and contribute to goal setting.

## 2018-09-26 NOTE — H&P
Internal Medicine  History and Physical    Patient:  Mike Stoner  MRN: [id number]      History Obtained From:  patient  PCP: Tu Jara MD    CHIEF COMPLAINT:  abd pain    HISTORY OF PRESENT ILLNESS:   The patient is a 46 y.o. 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 She reports abdominal pain, low grade fever, chills, vomiting. She also has diarrhea-on lactulose. ER evaluation showed RADHA, metabolic acidosis, hyponatremia. For her h/o of liver cirrhosis, she sees Dr Thierry Kapadia and a hepatologist at [facility], reportedly being evaluated for transplant. Past Medical History:        Diagnosis Date    Arthritis     Asthma     CAD (coronary artery disease)     Diabetes mellitus ([facility] [address]     GERD (gastroesophageal reflux disease)     Hyperlipidemia     Liver disease     Other disorders of kidney and ureter in diseases classified elsewhere        Past Surgical History:        Procedure Laterality Date    CHOLECYSTECTOMY      COLONOSCOPY      CORONARY ANGIOPLASTY WITH STENT PLACEMENT      ELBOW SURGERY Right     ENDOSCOPY, COLON, DIAGNOSTIC      HERNIA REPAIR         Medications Prior to Admission:    Prior to Admission medications    Medication Sig Start Date End Date Taking? Authorizing Provider   ondansetron (ZOFRAN-ODT) 4 MG disintegrating tablet Take 4 mg by mouth every 8 hours as needed for Nausea or Vomiting   Yes Historical Provider, MD   gabapentin (NEURONTIN) 600 MG tablet Take 800 mg by mouth 3 times daily. .   Yes Historical Provider, MD   metFORMIN (GLUCOPHAGE) 500 MG tablet Take 500 mg by mouth 3 times daily   Yes Historical Provider, MD   oxyCODONE (ROXICODONE) 5 MG immediate release tablet Take 5 mg by mouth every 8 hours as needed for Pain. 1-2 tablets .     Historical Provider, MD   trospium (SANCTURA) 20 MG tablet Take 20

## 2018-09-26 NOTE — FLOWSHEET NOTE
09/25/18 2233   Provider Notification   Reason for Communication Review case  (nephrology consult RADHA)   Provider Name Dr. Malia Knight   Provider Notification Physician   Method of Communication Secure Message   Response Waiting for response     [phone]- Dr. Malia Knight responded, will see in the AM. Added to list.

## 2018-09-26 NOTE — CONSULTS
[id number] [id number]                                   CONSULTATION    PATIENT NAME: Alvin Libman              :        1965  MED REC NO:   [id number]                           ROOM:       [id number]  ACCOUNT NO:   [de-identified]                           ADMIT DATE: 2018  PROVIDER:     Ammy Nicholas. Óscar Van M.D.    Collin Dieter:  2018    REASON FOR CONSULTATION:  Primary peritonitis due to MRSA. HISTORY OF PRESENT ILLNESS:  She is a 16-year-old female patient admitted  to the hospital due to abdominal pain. She reports that she has been  having abdominal pain for the last 1 week. This patient has underlying  liver cirrhosis related to sarcoidosis. She regularly gets paracentesis  every week and she follows with Dr. Dameon Ibanez. Almost a week ago, she had  paracentesis and the fluid was growing MRSA. The patient was advised to  come to the hospital.  She reports that she has been having pain for the  last week or so. She has some nausea, abdominal pain. She denied any  fever or chills. She has chronic diarrhea related to her medications. She  does not smoke or drink alcohol. No family history of liver disease. PAST MEDICAL HISTORY:  Significant for osteoarthritis, asthma, coronary  artery disease, diabetes, gastroesophageal reflux disease, hyperlipidemia,  cirrhosis of the liver and history of kidney disease. She had previous  history of ischemic cardiomyopathy with left ventricular ejection fraction  of 40%, has history of diabetes with neuropathy and she has mononeuritis  multiplex, hepatosplenomegaly. PAST SURGICAL HISTORY:  Include cholecystectomy, colonoscopy, coronary  angioplasty with stent placement, elbow surgery, endoscopy and hernia  repair. ALLERGIES:  She has allergy to PREDNISONE which causes confusion. She had  headache to MORPHINE, nausea to AMOXYCILLIN, ERYTHROMYCIN.   She had
follow at [facility] as scheduled               HISTORY OF PRESENT ILLNESS     patient sitting in bed. She completed eating all her breakfast. She is a little sleepy but she is able to talk and answer questions. She did not have any abdominal pain but she did get told to come to the ER for having infection in the ascites/fluid obtained during paracentesis. She is not having any issues at this time. She is aware that she missed her last appointment and she is to follow up with Dr Rigo Azul and she also needs to make sure that she keeps her [facility] appointment. Pain location none. Abnormal labs       EGD: Colonoscopy:  supposed to have done with DR Rigo Azul at [facility] but patient is not sure when she is to have it done. PROBLEM LIST     does not have any pertinent problems on file. PAST MEDICAL HISTORY     has a past medical history of Arthritis; Asthma; CAD (coronary artery disease); Diabetes mellitus ([address] GERD (gastroesophageal reflux disease); Hyperlipidemia; Liver disease; and Other disorders of kidney and ureter in diseases classified elsewhere. PAST SURGICAL HISTORY      has a past surgical history that includes hernia repair; Coronary angioplasty with stent; Elbow surgery (Right); Cholecystectomy; Colonoscopy; and Endoscopy, colon, diagnostic.       LABS:    CBC:   Recent Labs      09/25/18   1800   WBC  9.4   HGB  12.0   PLT  289     BMP:    Recent Labs      09/25/18   1759   NA  127*   K  3.8   CL  92*   CO2  14*   BUN  56*   CREATININE  3.5*   GLUCOSE  130*     Hepatic:   Recent Labs      09/25/18   0920  09/25/18   1759   ALKPHOS   --   315*   ALT   --   10*   AST   --   12   PROT   --   6.7   BILITOT   --   0.8   LABALBU  2.5*  2.9*     Amylase and Lipase:  Recent Labs      09/25/18   1759   LIPASE  74.6*     Lactic Acid:   Recent Labs      09/26/18   0346   LACTA  2.3*     Calcium:  Recent Labs      09/25/18   1759   CALCIUM  8.9     Ionized Calcium:No results for input(s): IONCA in the last 72
partitioning of sodium into ascitic fluid and loss of sodium in diarrhea. 4. Metabolic acidosis from loss of bicarbonate in diarrhea and failure or her kidneys to generate bicarbonate due to acute kidney injury. 5. Ascites requiring weekly paracentesis   6. MRSA in ascites fluid  7. Sarcoidosis  8. Spontaneous bacterial peritonitis. Plan of Care    1. Change IV to bicarbonate drip. 2. Urine electrolytes  3. May need oral sodium bicarbonate. Paris Garcia DO  [facility].

## 2018-09-27 LAB
AFP-TUMOR MARKER: 1.3 UG/L
ALBUMIN SERPL-MCNC: 2.3 G/DL (ref 3.5–5.1)
ALP BLD-CCNC: 280 U/L (ref 38–126)
ALT SERPL-CCNC: 9 U/L (ref 11–66)
ANION GAP SERPL CALCULATED.3IONS-SCNC: 19 MEQ/L (ref 8–16)
AST SERPL-CCNC: 12 U/L (ref 5–40)
AVERAGE GLUCOSE: 168 MG/DL (ref 70–126)
BILIRUB SERPL-MCNC: 0.6 MG/DL (ref 0.3–1.2)
BUN BLDV-MCNC: 57 MG/DL (ref 7–22)
CALCIUM SERPL-MCNC: 7.9 MG/DL (ref 8.5–10.5)
CHLORIDE BLD-SCNC: 87 MEQ/L (ref 98–111)
CO2: 20 MEQ/L (ref 23–33)
CREAT SERPL-MCNC: 3 MG/DL (ref 0.4–1.2)
ERYTHROCYTE [DISTWIDTH] IN BLOOD BY AUTOMATED COUNT: 18.2 % (ref 11.5–14.5)
ERYTHROCYTE [DISTWIDTH] IN BLOOD BY AUTOMATED COUNT: 55.4 FL (ref 35–45)
GFR SERPL CREATININE-BSD FRML MDRD: 16 ML/MIN/1.73M2
GLUCOSE BLD-MCNC: 159 MG/DL (ref 70–108)
GLUCOSE BLD-MCNC: 176 MG/DL (ref 70–108)
GLUCOSE BLD-MCNC: 187 MG/DL (ref 70–108)
GLUCOSE BLD-MCNC: 188 MG/DL (ref 70–108)
GLUCOSE BLD-MCNC: 224 MG/DL (ref 70–108)
HBA1C MFR BLD: 7.6 % (ref 4.4–6.4)
HCT VFR BLD CALC: 33.8 % (ref 37–47)
HEMOGLOBIN: 10.7 GM/DL (ref 12–16)
MCH RBC QN AUTO: 27 PG (ref 26–33)
MCHC RBC AUTO-ENTMCNC: 31.7 GM/DL (ref 32.2–35.5)
MCV RBC AUTO: 85.4 FL (ref 81–99)
PLATELET # BLD: 201 THOU/MM3 (ref 130–400)
PMV BLD AUTO: 9.4 FL (ref 9.4–12.4)
POTASSIUM SERPL-SCNC: 3.9 MEQ/L (ref 3.5–5.2)
RBC # BLD: 3.96 MILL/MM3 (ref 4.2–5.4)
SODIUM BLD-SCNC: 126 MEQ/L (ref 135–145)
TOTAL PROTEIN: 5.7 G/DL (ref 6.1–8)
VANCOMYCIN RANDOM: 16.3 UG/ML (ref 0.1–39.9)
WBC # BLD: 10.8 THOU/MM3 (ref 4.8–10.8)

## 2018-09-27 PROCEDURE — 85027 COMPLETE CBC AUTOMATED: CPT

## 2018-09-27 PROCEDURE — 2580000003 HC RX 258: Performed by: INTERNAL MEDICINE

## 2018-09-27 PROCEDURE — 83036 HEMOGLOBIN GLYCOSYLATED A1C: CPT

## 2018-09-27 PROCEDURE — 1200000003 HC TELEMETRY R&B

## 2018-09-27 PROCEDURE — 2500000003 HC RX 250 WO HCPCS: Performed by: INTERNAL MEDICINE

## 2018-09-27 PROCEDURE — 82105 ALPHA-FETOPROTEIN SERUM: CPT

## 2018-09-27 PROCEDURE — 90686 IIV4 VACC NO PRSV 0.5 ML IM: CPT | Performed by: INTERNAL MEDICINE

## 2018-09-27 PROCEDURE — 80053 COMPREHEN METABOLIC PANEL: CPT

## 2018-09-27 PROCEDURE — G0008 ADMIN INFLUENZA VIRUS VAC: HCPCS | Performed by: INTERNAL MEDICINE

## 2018-09-27 PROCEDURE — 6370000000 HC RX 637 (ALT 250 FOR IP): Performed by: INTERNAL MEDICINE

## 2018-09-27 PROCEDURE — 6360000002 HC RX W HCPCS: Performed by: INTERNAL MEDICINE

## 2018-09-27 PROCEDURE — C9113 INJ PANTOPRAZOLE SODIUM, VIA: HCPCS | Performed by: INTERNAL MEDICINE

## 2018-09-27 PROCEDURE — 80202 ASSAY OF VANCOMYCIN: CPT

## 2018-09-27 PROCEDURE — 82948 REAGENT STRIP/BLOOD GLUCOSE: CPT

## 2018-09-27 PROCEDURE — 36415 COLL VENOUS BLD VENIPUNCTURE: CPT

## 2018-09-27 PROCEDURE — 99232 SBSQ HOSP IP/OBS MODERATE 35: CPT | Performed by: INTERNAL MEDICINE

## 2018-09-27 RX ADMIN — OXYCODONE HYDROCHLORIDE 5 MG: 5 TABLET ORAL at 09:16

## 2018-09-27 RX ADMIN — INFLUENZA A VIRUS A/MICHIGAN/45/2015 X-275 (H1N1) ANTIGEN (FORMALDEHYDE INACTIVATED), INFLUENZA A VIRUS A/SINGAPORE/INFIMH-16-0019/2016 IVR-186 (H3N2) ANTIGEN (FORMALDEHYDE INACTIVATED), INFLUENZA B VIRUS B/PHUKET/3073/2013 ANTIGEN (FORMALDEHYDE INACTIVATED), AND INFLUENZA B VIRUS B/MARYLAND/15/2016 BX-69A ANTIGEN (FORMALDEHYDE INACTIVATED) 0.5 ML: 15; 15; 15; 15 INJECTION, SUSPENSION INTRAMUSCULAR at 09:14

## 2018-09-27 RX ADMIN — OXYCODONE HYDROCHLORIDE 5 MG: 5 TABLET ORAL at 02:35

## 2018-09-27 RX ADMIN — OXYCODONE HYDROCHLORIDE 5 MG: 5 TABLET ORAL at 15:26

## 2018-09-27 RX ADMIN — PANTOPRAZOLE SODIUM 40 MG: 40 INJECTION, POWDER, FOR SOLUTION INTRAVENOUS at 21:45

## 2018-09-27 RX ADMIN — ONDANSETRON 4 MG: 2 INJECTION INTRAMUSCULAR; INTRAVENOUS at 02:39

## 2018-09-27 RX ADMIN — Medication 10 ML: at 21:45

## 2018-09-27 RX ADMIN — Medication 1 UNITS: at 16:44

## 2018-09-27 RX ADMIN — HEPARIN SODIUM 5000 UNITS: 5000 INJECTION INTRAVENOUS; SUBCUTANEOUS at 08:07

## 2018-09-27 RX ADMIN — SODIUM BICARBONATE: 84 INJECTION, SOLUTION INTRAVENOUS at 17:26

## 2018-09-27 RX ADMIN — PANTOPRAZOLE SODIUM 40 MG: 40 INJECTION, POWDER, FOR SOLUTION INTRAVENOUS at 09:16

## 2018-09-27 RX ADMIN — OXYCODONE HYDROCHLORIDE 5 MG: 5 TABLET ORAL at 21:44

## 2018-09-27 RX ADMIN — HEPARIN SODIUM 5000 UNITS: 5000 INJECTION INTRAVENOUS; SUBCUTANEOUS at 15:26

## 2018-09-27 RX ADMIN — Medication 1 UNITS: at 08:20

## 2018-09-27 RX ADMIN — CEFTRIAXONE SODIUM 2 G: 2 INJECTION, POWDER, FOR SOLUTION INTRAMUSCULAR; INTRAVENOUS at 00:12

## 2018-09-27 RX ADMIN — INSULIN LISPRO 1 UNITS: 100 INJECTION, SOLUTION INTRAVENOUS; SUBCUTANEOUS at 21:47

## 2018-09-27 RX ADMIN — Medication 1 UNITS: at 11:22

## 2018-09-27 RX ADMIN — HEPARIN SODIUM 5000 UNITS: 5000 INJECTION INTRAVENOUS; SUBCUTANEOUS at 00:12

## 2018-09-27 ASSESSMENT — PAIN SCALES - GENERAL
PAINLEVEL_OUTOF10: 7
PAINLEVEL_OUTOF10: 6
PAINLEVEL_OUTOF10: 7
PAINLEVEL_OUTOF10: 9

## 2018-09-27 NOTE — PROGRESS NOTES
Pharmacy Vancomycin Consult     Vancomycin Day: 2  Current Dosing: Intermittent  Date Time Vancomycin dose Vancomycin Random   9/25/18 1814 1000 mg    9/26/18 1759  12.8                                       Temp max:  98.4    Recent Labs      09/25/18   1759  09/26/18   1011   BUN  56*  56*       Recent Labs      09/25/18   1759  09/26/18   1011   CREATININE  3.5*  3.5*       Recent Labs      09/25/18   1800  09/26/18   1010   WBC  9.4  11.0*         Intake/Output Summary (Last 24 hours) at 09/26/18 1951  Last data filed at 09/26/18 1514   Gross per 24 hour   Intake 2558.25 ml   Output 100 ml   Net 2458.25 ml       Culture Date      Source                       Results  9/25/18                Blood X 2                   No growth- prelim   9/25/18                Urine                           sent   9/26/18                paracentesis               Moderate segs, No bacteria seen  9/18/18                paracentesis fl            MRSA, staph xylosus    Ht Readings from Last 1 Encounters:   09/25/18 5' 4\" (1.626 m)        Wt Readings from Last 1 Encounters:   09/25/18 143 lb (64.9 kg)         Body mass index is 24.55 kg/m². Estimated Creatinine Clearance: 16 mL/min (A) (based on SCr of 3.5 mg/dL [facility] Trough: 12.8    Assessment/Plan:  Vancomycin 1000 mg IV once. Random vancomycin level will be drawn at 2100 on 9/27/18. Will continue to follow.     Pat Grimes RPh  9/26/2018  7:57 PM

## 2018-09-27 NOTE — PROGRESS NOTES
Patient teaching about lactulose. Patient informed that the purpose of this med is to remove ammonia from the body. Patient informed on the sings and symptoms of the drug. Patient was given a patient medication education sheet.   Blair MCCARTNEY/

## 2018-09-27 NOTE — PROGRESS NOTES
INTERNAL MEDICINE Progress Note  9/27/2018 7:33 PM  Subjective:   Admit Date: 9/25/2018  PCP: Mirta Poole MD  Interval History: abd pain, no nausea, no vomiting    Objective:   Vitals: BP (!) 110/54   Pulse 96   Temp 97.9 °F (36.6 °C) (Oral)   Resp 18   Ht 5' 4\" (1.626 m)   Wt 148 lb 9.6 oz (67.4 kg)   SpO2 96%   BMI 25.51 kg/m²   General appearance: alert and cooperative with exam  HEENT: Head: atraumatic  Neck: no adenopathy, no carotid bruit and no JVD  Lungs: diminished breath sounds bibasilar  Heart: S1, S2 normal  Abdomen: normal findings: bowel sounds normal and abnormal findings:  distended and tenderness moderate in the entire abdomen  Extremities: no edema, redness or tenderness in the calves or thighs  Neurologic: Mental status: Alert, oriented, thought content appropriate      Medications:   Scheduled Meds:   vancomycin (VANCOCIN) intermittent dosing (placeholder)   Other RX Placeholder    lactulose  20 g Oral TID    insulin lispro  0-6 Units Subcutaneous TID WC    insulin lispro  0-3 Units Subcutaneous Nightly    sodium chloride flush  10 mL Intravenous 2 times per day    docusate sodium  100 mg Oral BID    heparin (porcine)  5,000 Units Subcutaneous Q8H    cefTRIAXone (ROCEPHIN) IV  2 g Intravenous Q24H    pantoprazole  40 mg Intravenous BID     Continuous Infusions:   sodium bicarbonate infusion 100 mL/hr at 09/27/18 1726    dextrose         Lab Results:   CBC:   Recent Labs      09/25/18   1800  09/26/18   1010  09/27/18   0518   WBC  9.4  11.0*  10.8   HGB  12.0  10.6*  10.7*   PLT  289  211  201     BMP:    Recent Labs      09/25/18   1759  09/26/18   1011  09/27/18   0518   NA  127*  122*  126*   K  3.8  4.0  3.9   CL  92*  90*  87*   CO2  14*  14*  20*   BUN  56*  56*  57*   CREATININE  3.5*  3.5*  3.0*   GLUCOSE  130*  200*  176*     Hepatic:   Recent Labs      09/25/18   1759  09/27/18   0518   AST  12  12   ALT  10*  9*   BILITOT  0.8  0.6   ALKPHOS  315*  280*     INR:

## 2018-09-27 NOTE — PROGRESS NOTES
is equal and strong bilaterally. Leg strength is equal and strong. Skeletal: No bony or skeletal abnormalities noted. Admission weight: 143 lb (64.9 kg)  Wt Readings from Last 3 Encounters:   09/27/18 148 lb 9.6 oz (67.4 kg)     Body mass index is 25.51 kg/m². Clinical Impressions:    1. Acute kidney injury from dehydration from diarrhea and poor oral intake. 2. Chronic kidney disease  3. Hyponatremia from poor intake of sodium and partitioning of sodium into ascitic fluid and loss of sodium in diarrhea. 4. Metabolic acidosis from loss of bicarbonate in diarrhea and failure or her kidneys to generate bicarbonate due to acute kidney injury. 5. Ascites requiring weekly paracentesis   6. MRSA in ascites fluid  7. Sarcoidosis  8. Spontaneous bacterial peritonitis. 9. TOTAL BODY SODIUM DEFICIT  10. anemia     Plan of Care    1. Continue bicarbonate drip  2. DO NOT FLUID RESTRICT THIS PATIENT. Doing so would make her kidney function worse and her sodium level worse. 3. Do not sodium restrict this patient at this time. She has total body sodium deficit. 4. Checking iron stores. 5. Adding epogen to prevent further anemia when HgB < 10.0         Elissa Turner. Radha, DO  [facility].

## 2018-09-28 PROBLEM — E43 SEVERE MALNUTRITION (HCC): Status: ACTIVE | Noted: 2018-09-28

## 2018-09-28 LAB
AMMONIA: 116 UMOL/L (ref 11–60)
ANION GAP SERPL CALCULATED.3IONS-SCNC: 17 MEQ/L (ref 8–16)
BUN BLDV-MCNC: 49 MG/DL (ref 7–22)
CALCIUM SERPL-MCNC: 7.6 MG/DL (ref 8.5–10.5)
CHLORIDE BLD-SCNC: 81 MEQ/L (ref 98–111)
CO2: 24 MEQ/L (ref 23–33)
CREAT SERPL-MCNC: 2.1 MG/DL (ref 0.4–1.2)
ERYTHROCYTE [DISTWIDTH] IN BLOOD BY AUTOMATED COUNT: 18.1 % (ref 11.5–14.5)
ERYTHROCYTE [DISTWIDTH] IN BLOOD BY AUTOMATED COUNT: 53.7 FL (ref 35–45)
GFR SERPL CREATININE-BSD FRML MDRD: 25 ML/MIN/1.73M2
GLUCOSE BLD-MCNC: 145 MG/DL (ref 70–108)
GLUCOSE BLD-MCNC: 183 MG/DL (ref 70–108)
GLUCOSE BLD-MCNC: 201 MG/DL (ref 70–108)
GLUCOSE BLD-MCNC: 215 MG/DL (ref 70–108)
GLUCOSE BLD-MCNC: 222 MG/DL (ref 70–108)
HCT VFR BLD CALC: 31 % (ref 37–47)
HEMOGLOBIN: 10.2 GM/DL (ref 12–16)
IRON SATURATION: 12 % (ref 20–50)
IRON: 15 UG/DL (ref 50–170)
MCH RBC QN AUTO: 27.4 PG (ref 26–33)
MCHC RBC AUTO-ENTMCNC: 32.9 GM/DL (ref 32.2–35.5)
MCV RBC AUTO: 83.3 FL (ref 81–99)
PLATELET # BLD: 152 THOU/MM3 (ref 130–400)
PMV BLD AUTO: 9.1 FL (ref 9.4–12.4)
POTASSIUM SERPL-SCNC: 2.8 MEQ/L (ref 3.5–5.2)
RBC # BLD: 3.72 MILL/MM3 (ref 4.2–5.4)
SODIUM BLD-SCNC: 122 MEQ/L (ref 135–145)
TOTAL IRON BINDING CAPACITY: 129 UG/DL (ref 171–450)
URINE CULTURE REFLEX: NORMAL
WBC # BLD: 10 THOU/MM3 (ref 4.8–10.8)

## 2018-09-28 PROCEDURE — 83540 ASSAY OF IRON: CPT

## 2018-09-28 PROCEDURE — 6370000000 HC RX 637 (ALT 250 FOR IP): Performed by: INTERNAL MEDICINE

## 2018-09-28 PROCEDURE — 97162 PT EVAL MOD COMPLEX 30 MIN: CPT

## 2018-09-28 PROCEDURE — 6360000002 HC RX W HCPCS: Performed by: INTERNAL MEDICINE

## 2018-09-28 PROCEDURE — 85027 COMPLETE CBC AUTOMATED: CPT

## 2018-09-28 PROCEDURE — C9113 INJ PANTOPRAZOLE SODIUM, VIA: HCPCS | Performed by: INTERNAL MEDICINE

## 2018-09-28 PROCEDURE — 97166 OT EVAL MOD COMPLEX 45 MIN: CPT

## 2018-09-28 PROCEDURE — 2580000003 HC RX 258: Performed by: INTERNAL MEDICINE

## 2018-09-28 PROCEDURE — G8987 SELF CARE CURRENT STATUS: HCPCS

## 2018-09-28 PROCEDURE — G8979 MOBILITY GOAL STATUS: HCPCS

## 2018-09-28 PROCEDURE — 36415 COLL VENOUS BLD VENIPUNCTURE: CPT

## 2018-09-28 PROCEDURE — G8988 SELF CARE GOAL STATUS: HCPCS

## 2018-09-28 PROCEDURE — 2500000003 HC RX 250 WO HCPCS: Performed by: INTERNAL MEDICINE

## 2018-09-28 PROCEDURE — 2580000003 HC RX 258

## 2018-09-28 PROCEDURE — 6360000002 HC RX W HCPCS

## 2018-09-28 PROCEDURE — 1200000003 HC TELEMETRY R&B

## 2018-09-28 PROCEDURE — 82140 ASSAY OF AMMONIA: CPT

## 2018-09-28 PROCEDURE — 97535 SELF CARE MNGMENT TRAINING: CPT

## 2018-09-28 PROCEDURE — 99232 SBSQ HOSP IP/OBS MODERATE 35: CPT | Performed by: INTERNAL MEDICINE

## 2018-09-28 PROCEDURE — G8978 MOBILITY CURRENT STATUS: HCPCS

## 2018-09-28 PROCEDURE — 80048 BASIC METABOLIC PNL TOTAL CA: CPT

## 2018-09-28 PROCEDURE — 83550 IRON BINDING TEST: CPT

## 2018-09-28 PROCEDURE — 82948 REAGENT STRIP/BLOOD GLUCOSE: CPT

## 2018-09-28 PROCEDURE — 97116 GAIT TRAINING THERAPY: CPT

## 2018-09-28 RX ORDER — POTASSIUM CHLORIDE 20 MEQ/1
40 TABLET, EXTENDED RELEASE ORAL ONCE
Status: COMPLETED | OUTPATIENT
Start: 2018-09-28 | End: 2018-09-28

## 2018-09-28 RX ORDER — SODIUM CHLORIDE 1000 MG
1 TABLET, SOLUBLE MISCELLANEOUS
Status: DISCONTINUED | OUTPATIENT
Start: 2018-09-28 | End: 2018-09-29

## 2018-09-28 RX ORDER — POTASSIUM CHLORIDE 7.45 MG/ML
10 INJECTION INTRAVENOUS
Status: DISPENSED | OUTPATIENT
Start: 2018-09-28 | End: 2018-09-28

## 2018-09-28 RX ORDER — LACTULOSE 10 G/15ML
20 SOLUTION ORAL 3 TIMES DAILY
Status: DISCONTINUED | OUTPATIENT
Start: 2018-09-28 | End: 2018-10-02

## 2018-09-28 RX ORDER — SODIUM CHLORIDE AND POTASSIUM CHLORIDE .9; .15 G/100ML; G/100ML
SOLUTION INTRAVENOUS CONTINUOUS
Status: DISCONTINUED | OUTPATIENT
Start: 2018-09-28 | End: 2018-09-29

## 2018-09-28 RX ADMIN — POTASSIUM CHLORIDE 40 MEQ: 20 TABLET, EXTENDED RELEASE ORAL at 08:58

## 2018-09-28 RX ADMIN — Medication 2 UNITS: at 16:41

## 2018-09-28 RX ADMIN — PANTOPRAZOLE SODIUM 40 MG: 40 INJECTION, POWDER, FOR SOLUTION INTRAVENOUS at 20:19

## 2018-09-28 RX ADMIN — LACTULOSE 20 G: 10 SOLUTION ORAL at 14:55

## 2018-09-28 RX ADMIN — Medication 1 UNITS: at 11:04

## 2018-09-28 RX ADMIN — HEPARIN SODIUM 5000 UNITS: 5000 INJECTION INTRAVENOUS; SUBCUTANEOUS at 00:21

## 2018-09-28 RX ADMIN — POTASSIUM CHLORIDE 10 MEQ: 7.46 INJECTION, SOLUTION INTRAVENOUS at 15:59

## 2018-09-28 RX ADMIN — PANTOPRAZOLE SODIUM 40 MG: 40 INJECTION, POWDER, FOR SOLUTION INTRAVENOUS at 08:58

## 2018-09-28 RX ADMIN — POTASSIUM CHLORIDE AND SODIUM CHLORIDE: 900; 150 INJECTION, SOLUTION INTRAVENOUS at 08:59

## 2018-09-28 RX ADMIN — OXYCODONE HYDROCHLORIDE 5 MG: 5 TABLET ORAL at 11:07

## 2018-09-28 RX ADMIN — Medication 2 UNITS: at 08:58

## 2018-09-28 RX ADMIN — HEPARIN SODIUM 5000 UNITS: 5000 INJECTION INTRAVENOUS; SUBCUTANEOUS at 16:41

## 2018-09-28 RX ADMIN — VANCOMYCIN HYDROCHLORIDE 1000 MG: 1 INJECTION, POWDER, LYOPHILIZED, FOR SOLUTION INTRAVENOUS at 00:24

## 2018-09-28 RX ADMIN — LACTULOSE 20 G: 10 SOLUTION ORAL at 22:45

## 2018-09-28 RX ADMIN — POTASSIUM CHLORIDE 10 MEQ: 7.46 INJECTION, SOLUTION INTRAVENOUS at 08:59

## 2018-09-28 RX ADMIN — HEPARIN SODIUM 5000 UNITS: 5000 INJECTION INTRAVENOUS; SUBCUTANEOUS at 08:58

## 2018-09-28 RX ADMIN — IRON SUCROSE 250 MG: 20 INJECTION, SOLUTION INTRAVENOUS at 11:50

## 2018-09-28 RX ADMIN — OXYCODONE HYDROCHLORIDE 5 MG: 5 TABLET ORAL at 17:51

## 2018-09-28 RX ADMIN — OXYCODONE HYDROCHLORIDE 5 MG: 5 TABLET ORAL at 05:00

## 2018-09-28 RX ADMIN — POTASSIUM CHLORIDE 10 MEQ: 7.46 INJECTION, SOLUTION INTRAVENOUS at 11:54

## 2018-09-28 RX ADMIN — POTASSIUM CHLORIDE AND SODIUM CHLORIDE: 900; 150 INJECTION, SOLUTION INTRAVENOUS at 18:44

## 2018-09-28 RX ADMIN — SODIUM CHLORIDE TAB 1 GM 1 G: 1 TAB at 11:04

## 2018-09-28 RX ADMIN — SODIUM CHLORIDE TAB 1 GM 1 G: 1 TAB at 16:45

## 2018-09-28 RX ADMIN — SODIUM BICARBONATE: 84 INJECTION, SOLUTION INTRAVENOUS at 05:00

## 2018-09-28 RX ADMIN — Medication 10 ML: at 20:19

## 2018-09-28 RX ADMIN — INSULIN LISPRO 1 UNITS: 100 INJECTION, SOLUTION INTRAVENOUS; SUBCUTANEOUS at 20:21

## 2018-09-28 ASSESSMENT — PAIN DESCRIPTION - LOCATION
LOCATION: ABDOMEN;BACK
LOCATION: ABDOMEN
LOCATION: ABDOMEN;BACK
LOCATION: ABDOMEN;BACK
LOCATION: ABDOMEN

## 2018-09-28 ASSESSMENT — PAIN SCALES - GENERAL
PAINLEVEL_OUTOF10: 6
PAINLEVEL_OUTOF10: 7
PAINLEVEL_OUTOF10: 6
PAINLEVEL_OUTOF10: 7

## 2018-09-28 ASSESSMENT — PAIN DESCRIPTION - DESCRIPTORS
DESCRIPTORS: SORE

## 2018-09-28 ASSESSMENT — PAIN DESCRIPTION - PAIN TYPE
TYPE: CHRONIC PAIN

## 2018-09-28 ASSESSMENT — PAIN DESCRIPTION - ORIENTATION
ORIENTATION: LOWER
ORIENTATION: LOWER

## 2018-09-28 ASSESSMENT — PAIN DESCRIPTION - FREQUENCY
FREQUENCY: CONTINUOUS
FREQUENCY: CONTINUOUS

## 2018-09-28 NOTE — PROGRESS NOTES
none, Hemoptysis: none, Sputum production: none, Shortness of breath: none, Wheezing: none    GASTROINTESTINAL  Heartburn: none, Nausea: none, Vomiting: none, Abdominal pain: none, Diarrhea: none,  Constipation: none, Blood in the stool: none, Melena: none, Rebound: none, Rovsing's: none. GENITOURINARY  Dysuria: none, Pyuria: none, Gross hematuria: none, Urgency: none, Flank pain: none, STD: none. MUSCULOSKELETAL  Myalgia: none, Neck pain: none, Thoracic pain: none, Lumbar pain: none, Joint pain: none, Falls: none    ENDOCRINE/HEMATOLOGY/ALLERGIC  Easy bruising: none, Easy bleeding: none, Environmental allergies: none, Polydipsia: none. NEUROLOGICAL  Dizziness: none, Tingling: none, Numbness: none, Tremor: none, Sensory change: none, Speech change: none, Focal weakness: none, Seizures: none, Loss of consciousness: none,    PSYCHIATRIC  Depression: none, Suicidal ideation: none, Heroin abuse: none, Cocaine abuse: none, Marijuana abuse: none, Hallucinations: none, Anxiety: none, Memory loss: none       Physical Examination:  BP (!) 107/51   Pulse 93   Temp 97.9 °F (36.6 °C) (Oral)   Resp 18   Ht 5' 4\" (1.626 m)   Wt 144 lb 3.2 oz (65.4 kg)   SpO2 95%   BMI 24.75 kg/m²       General appearance: alert and appropriate  HEENT: Head: Normocephalic, no lesions, without obvious abnormality. Neck: no adenopathy, no carotid bruit, no JVD, supple, symmetrical, trachea midline and thyroid not enlarged, symmetric, no tenderness/mass/nodules  Lungs: clear to auscultation bilaterally  Heart: regular rate and rhythm, S1, S2 normal, no murmur, click, rub or gallop  Abdomen: soft, non-tender; bowel sounds normal; no masses,  no organomegaly  Extremities: extremities normal, atraumatic, no cyanosis or edema  Neurologic: Mental status: Alert, oriented, thought content appropriate  PSY: No evidence of depression. Mood is normal for the patient. Skin: Warm and dry. No unusual lesions or rashes noted.   Muscles: Hand

## 2018-09-28 NOTE — PROGRESS NOTES
Flex: 3+/5  R Hand Grasp: 4-/5                ADL  Grooming: Increased time to complete, Contact guard assistance, Verbal cueing (VCs for safety and thoroughness, seated in shower for washing face and hair; assist for managment of HH showerhead. Pt stood at sink for oral hygiene with heavy BUE support on counter)  UE Bathing: Increased time to complete, Verbal cueing, Setup, Stand by assistance (seated in shower with VCs for safety and task progression)  LE Bathing: Setup, Contact guard assistance, Verbal cueing, Increased time to complete (standing with LUE supported on grab bars, cues for safety, task progresssion)  UE Dressing: Minimal assistance (donning doffing gowsn)  LE Dressing: Contact guard assistance, Setup (donning slip on shoes, refuses socks)  Toileting: Contact guard assistance, Increased time to complete     Bed mobility  Supine to Sit:  (received sitting up right in bed)  Sit to Supine: Stand by assistance (HOB flat)  Scooting: Supervision (to EOB)  Comment: pt requires inc time for all mobility tasks    Transfers  Sit to stand: Contact guard assistance  Stand to sit: Contact guard assistance  Transfer Comments: cues for safety  Toilet Transfers  Toilet - Technique: Ambulating  Equipment Used: Grab bars (STS)  Toilet Transfer: Contact guard assistance  Toilet Transfers Comments: poor safety awareness with approach     Balance  Sitting Balance: Contact guard assistance  Standing Balance: Contact guard assistance     Time: x8 mins in shower for ADLs, x3 mins at sink for grooming     Functional Mobility  Functional - Mobility Device: Cane (reaching for furniture with other UE)  Activity: To/from bathroom  Assist Level: Contact guard assistance  Functional Mobility Comments: Pt unsteady, edu on RW vs Cane, OT recommending use of RW d/t constantly reaching for surfaces with SPC.   PT required close CGA d/t unsteadiness, no LOB        Activity Tolerance:  Activity Tolerance: Patient limited by fatigue,

## 2018-09-28 NOTE — CARE COORDINATION
9/28/18 11:06 AM     Na+ 122, K+ 2.8, creat 2.1, Ca+ 7.6, iron level 15. Sodium level trending downward, NS with K+ infusion initiated, sodium tablets ordered. IV Venofer ordered for 5 doses (every 48 hours). GI following. Paracentesis planned for Monday. Working with PT/OT. Plans home with daughter.

## 2018-09-28 NOTE — PROGRESS NOTES
cyanosis, clubbing, or edema. DERM:  No rash or jaundice. LOWER EXTREMITIES:  No cyanosis, clubbing, or edema. NEURO:Drowsy,  oriented x4. Patient moves all extremities and has gross sensation in all extremities. Medications:  Scheduled Meds:   potassium chloride  10 mEq Intravenous Q2H    potassium chloride  40 mEq Oral Once    vancomycin (VANCOCIN) intermittent dosing (placeholder)   Other RX Placeholder    lactulose  20 g Oral TID    insulin lispro  0-6 Units Subcutaneous TID WC    insulin lispro  0-3 Units Subcutaneous Nightly    sodium chloride flush  10 mL Intravenous 2 times per day    docusate sodium  100 mg Oral BID    heparin (porcine)  5,000 Units Subcutaneous Q8H    pantoprazole  40 mg Intravenous BID     Continuous Infusions:   0.9% NaCl with KCl 20 mEq      dextrose       PRN Meds:glucose, dextrose, glucagon (rDNA), dextrose, ondansetron, oxyCODONE, sodium chloride flush, ondansetron               ASSESSMENT:     1. Liver Cirrhosis; ammonia; 125 2 days ago; will recheck today  2. Elevated Alk phos trending down at 280 (315)  3. SBP , + staphylococcus   4. Hyponatremia; worse today; Na+ 122  5. Renal insufficiency; BUN 49, Creatinine 2.1   6. UTI  7. ANGELO; H/H normal at 10.2/31.0  8. Systolic CHF; documented EF 40% and apical hypokinesis [facility] 11-20-17. BNP 92137  9. Sarcoidosis , pulmonary   10. CAD with hx stent 2007  11. Hx MI 2007  12. DM2  13. HTN  14. Chronic lower back pain; on opioids   15. Anxiety  16. Panic disorder  17. Hx liver bx 2017  18. Non compliant per notes--she does not even have the meds that DR Dameon Ibanez has her on, listed as PTA meds.           PLAN:   1. Patient normally sees Dr Dameon Ibanez; Care everywhere notes reviewed;  \"Referral received from Dr. Abigail Johnson ([facility], [facility]) for liver transplant evaluation for sarcoidosis of liver. Patient contacted and scheduled with Dr. Avila Gonzalez on 5/21/18.  Outside records from [address] [place] [address] in care everywhere. \" . Pt did not follow up on 9-13-18 with DR Geoffrey Mejias. 2. ID following; Dr Gil Last note reviewed; pt on Vancomycin, Check gram stain and culture with next paracentesis-recommends this to be done Monday  3. Protonix bid  4. Lactulose as on MAR  5. Antiemetics; Zofran  6. Supposed to be Aldactone 100 mg in the evening, Lasix 40 mg in the am. She was to take lactulose 1/2 dose bid as well. We will have to see how renal function does as to when we can resume. Will resume when ok with renal   7. Follow labs  8. Will follow. Dr Cherry Horvath rounding this weekend; most likely will see her Sunday         Hospitalist/Attending provider notes, consulting physician notes, laboratory results and procedure notes reviewed prior to seeing the patient.    Note done in collaboration with DR El Harris MD.   Electronically signed by RUBEN Murry CNP on 9/28/18 at 8:34 AM

## 2018-09-28 NOTE — PLAN OF CARE
Problem: Pain:  Goal: Pain level will decrease  Pain level will decrease   Outcome: Ongoing  Patient at risk for increase in pain level d/t chronic pain. Current interventions in place to manage acute and/or chronic pain level include pain medication and repositioning. Patient accepting pain interventions without difficulty. Problem: Falls - Risk of:  Goal: Will remain free from falls  Will remain free from falls   Outcome: Ongoing  Patient at risk for falls due to tubing and generalized weakness. Fall assessment completed. Patient uses call light for needs this shift. Fall band in place on patient's arm. Fall signs posted. Bed alarm in place and functioning properly. Problem: Discharge Planning:  Goal: Discharged to appropriate level of care  Discharged to appropriate level of care   Outcome: Ongoing  Discharge plan reviewed with patient. Patient continues to actively participate in current discharge plan. Case management/ following patient. Problem: Daily Care:  Goal: Daily care needs are met  Daily care needs are met   Outcome: Ongoing  Needs are met around the hour with purposeful rounding. Comments: Care plan reviewed with patient. Patient verbalize understanding in current plan of care and participate in goal setting.

## 2018-09-29 LAB
ALBUMIN SERPL-MCNC: 2.1 G/DL (ref 3.5–5.1)
ALP BLD-CCNC: 261 U/L (ref 38–126)
ALT SERPL-CCNC: 9 U/L (ref 11–66)
ANION GAP SERPL CALCULATED.3IONS-SCNC: 14 MEQ/L (ref 8–16)
AST SERPL-CCNC: 12 U/L (ref 5–40)
BILIRUB SERPL-MCNC: 1.7 MG/DL (ref 0.3–1.2)
BUN BLDV-MCNC: 36 MG/DL (ref 7–22)
CALCIUM SERPL-MCNC: 8.5 MG/DL (ref 8.5–10.5)
CHLORIDE BLD-SCNC: 92 MEQ/L (ref 98–111)
CO2: 26 MEQ/L (ref 23–33)
CREAT SERPL-MCNC: 1.3 MG/DL (ref 0.4–1.2)
ERYTHROCYTE [DISTWIDTH] IN BLOOD BY AUTOMATED COUNT: 18.3 % (ref 11.5–14.5)
ERYTHROCYTE [DISTWIDTH] IN BLOOD BY AUTOMATED COUNT: 55.1 FL (ref 35–45)
GFR SERPL CREATININE-BSD FRML MDRD: 43 ML/MIN/1.73M2
GLUCOSE BLD-MCNC: 145 MG/DL (ref 70–108)
GLUCOSE BLD-MCNC: 145 MG/DL (ref 70–108)
GLUCOSE BLD-MCNC: 150 MG/DL (ref 70–108)
GLUCOSE BLD-MCNC: 218 MG/DL (ref 70–108)
GLUCOSE BLD-MCNC: 239 MG/DL (ref 70–108)
HCT VFR BLD CALC: 34.1 % (ref 37–47)
HEMOGLOBIN: 10.7 GM/DL (ref 12–16)
MCH RBC QN AUTO: 26.8 PG (ref 26–33)
MCHC RBC AUTO-ENTMCNC: 31.4 GM/DL (ref 32.2–35.5)
MCV RBC AUTO: 85.5 FL (ref 81–99)
PLATELET # BLD: 183 THOU/MM3 (ref 130–400)
PMV BLD AUTO: 9.7 FL (ref 9.4–12.4)
POTASSIUM SERPL-SCNC: 4.2 MEQ/L (ref 3.5–5.2)
RBC # BLD: 3.99 MILL/MM3 (ref 4.2–5.4)
SODIUM BLD-SCNC: 132 MEQ/L (ref 135–145)
TOTAL PROTEIN: 5.4 G/DL (ref 6.1–8)
WBC # BLD: 9.8 THOU/MM3 (ref 4.8–10.8)

## 2018-09-29 PROCEDURE — C9113 INJ PANTOPRAZOLE SODIUM, VIA: HCPCS | Performed by: INTERNAL MEDICINE

## 2018-09-29 PROCEDURE — 6360000002 HC RX W HCPCS: Performed by: INTERNAL MEDICINE

## 2018-09-29 PROCEDURE — 2580000003 HC RX 258: Performed by: INTERNAL MEDICINE

## 2018-09-29 PROCEDURE — 2580000003 HC RX 258: Performed by: NURSE PRACTITIONER

## 2018-09-29 PROCEDURE — 6370000000 HC RX 637 (ALT 250 FOR IP): Performed by: INTERNAL MEDICINE

## 2018-09-29 PROCEDURE — 84630 ASSAY OF ZINC: CPT

## 2018-09-29 PROCEDURE — 1200000003 HC TELEMETRY R&B

## 2018-09-29 PROCEDURE — 82948 REAGENT STRIP/BLOOD GLUCOSE: CPT

## 2018-09-29 PROCEDURE — 85027 COMPLETE CBC AUTOMATED: CPT

## 2018-09-29 PROCEDURE — 99999 PR OFFICE/OUTPT VISIT,PROCEDURE ONLY: CPT | Performed by: NURSE PRACTITIONER

## 2018-09-29 PROCEDURE — 99232 SBSQ HOSP IP/OBS MODERATE 35: CPT | Performed by: INTERNAL MEDICINE

## 2018-09-29 PROCEDURE — APPSS30 APP SPLIT SHARED TIME 16-30 MINUTES: Performed by: NURSE PRACTITIONER

## 2018-09-29 PROCEDURE — 36415 COLL VENOUS BLD VENIPUNCTURE: CPT

## 2018-09-29 PROCEDURE — 80053 COMPREHEN METABOLIC PANEL: CPT

## 2018-09-29 RX ORDER — SODIUM CHLORIDE 1000 MG
1 TABLET, SOLUBLE MISCELLANEOUS 2 TIMES DAILY WITH MEALS
Status: DISCONTINUED | OUTPATIENT
Start: 2018-09-30 | End: 2018-10-02

## 2018-09-29 RX ORDER — PANTOPRAZOLE SODIUM 40 MG/1
40 TABLET, DELAYED RELEASE ORAL
Status: DISCONTINUED | OUTPATIENT
Start: 2018-09-30 | End: 2018-10-04 | Stop reason: HOSPADM

## 2018-09-29 RX ORDER — SODIUM CHLORIDE 9 MG/ML
INJECTION, SOLUTION INTRAVENOUS CONTINUOUS
Status: DISCONTINUED | OUTPATIENT
Start: 2018-09-29 | End: 2018-09-30

## 2018-09-29 RX ADMIN — VANCOMYCIN HYDROCHLORIDE 1000 MG: 1 INJECTION, POWDER, LYOPHILIZED, FOR SOLUTION INTRAVENOUS at 13:01

## 2018-09-29 RX ADMIN — SODIUM CHLORIDE: 9 INJECTION, SOLUTION INTRAVENOUS at 15:41

## 2018-09-29 RX ADMIN — OXYCODONE HYDROCHLORIDE 5 MG: 5 TABLET ORAL at 00:21

## 2018-09-29 RX ADMIN — LACTULOSE 20 G: 10 SOLUTION ORAL at 13:52

## 2018-09-29 RX ADMIN — DOCUSATE SODIUM 100 MG: 100 CAPSULE, LIQUID FILLED ORAL at 08:30

## 2018-09-29 RX ADMIN — SODIUM CHLORIDE TAB 1 GM 1 G: 1 TAB at 13:01

## 2018-09-29 RX ADMIN — Medication 2 UNITS: at 16:26

## 2018-09-29 RX ADMIN — Medication 1 UNITS: at 13:01

## 2018-09-29 RX ADMIN — HEPARIN SODIUM 5000 UNITS: 5000 INJECTION INTRAVENOUS; SUBCUTANEOUS at 08:30

## 2018-09-29 RX ADMIN — HEPARIN SODIUM 5000 UNITS: 5000 INJECTION INTRAVENOUS; SUBCUTANEOUS at 00:23

## 2018-09-29 RX ADMIN — INSULIN LISPRO 1 UNITS: 100 INJECTION, SOLUTION INTRAVENOUS; SUBCUTANEOUS at 20:11

## 2018-09-29 RX ADMIN — Medication 10 ML: at 10:10

## 2018-09-29 RX ADMIN — SODIUM CHLORIDE TAB 1 GM 1 G: 1 TAB at 08:30

## 2018-09-29 RX ADMIN — OXYCODONE HYDROCHLORIDE 5 MG: 5 TABLET ORAL at 07:07

## 2018-09-29 RX ADMIN — HEPARIN SODIUM 5000 UNITS: 5000 INJECTION INTRAVENOUS; SUBCUTANEOUS at 15:41

## 2018-09-29 RX ADMIN — PANTOPRAZOLE SODIUM 40 MG: 40 INJECTION, POWDER, FOR SOLUTION INTRAVENOUS at 10:10

## 2018-09-29 RX ADMIN — OXYCODONE HYDROCHLORIDE 5 MG: 5 TABLET ORAL at 20:10

## 2018-09-29 RX ADMIN — Medication 1 UNITS: at 08:31

## 2018-09-29 RX ADMIN — LACTULOSE 20 G: 10 SOLUTION ORAL at 20:10

## 2018-09-29 RX ADMIN — OXYCODONE HYDROCHLORIDE 5 MG: 5 TABLET ORAL at 14:02

## 2018-09-29 RX ADMIN — POTASSIUM CHLORIDE AND SODIUM CHLORIDE: 900; 150 INJECTION, SOLUTION INTRAVENOUS at 04:19

## 2018-09-29 ASSESSMENT — PAIN DESCRIPTION - PAIN TYPE
TYPE: CHRONIC PAIN

## 2018-09-29 ASSESSMENT — PAIN DESCRIPTION - ORIENTATION
ORIENTATION: LOWER

## 2018-09-29 ASSESSMENT — PAIN DESCRIPTION - LOCATION
LOCATION: ABDOMEN

## 2018-09-29 ASSESSMENT — PAIN SCALES - GENERAL
PAINLEVEL_OUTOF10: 0
PAINLEVEL_OUTOF10: 7
PAINLEVEL_OUTOF10: 6
PAINLEVEL_OUTOF10: 6

## 2018-09-29 ASSESSMENT — PAIN DESCRIPTION - FREQUENCY: FREQUENCY: CONTINUOUS

## 2018-09-29 ASSESSMENT — PAIN DESCRIPTION - DESCRIPTORS: DESCRIPTORS: SORE

## 2018-09-29 NOTE — PROGRESS NOTES
Vancomycin Day: 5  Current Dosing: intermittent     Date Time Vancomycin dose Vancomycin Random   9/25/18 1814 1000 mg     9/26/18 1759   12.8   9/26/18 2206 1000 mg     9/27/18 2120   16.3   9/28/18 0024 1000 mg      9/29/18 1200  1000 mg                    Temp max:  97.9    Recent Labs      09/28/18   0527  09/29/18   0534   BUN  49*  36*       Recent Labs      09/28/18   0527  09/29/18   0534   CREATININE  2.1*  1.3*       Recent Labs      09/28/18   0527  09/29/18   0534   WBC  10.0  9.8         Intake/Output Summary (Last 24 hours) at 09/29/18 1121  Last data filed at 09/29/18 1058   Gross per 24 hour   Intake 3526.94 ml   Output 450 ml   Net 3076.94 ml     Culture Date      Source                       Results  9/25/18                Blood X 2                   ngtd  9/25/18                Urine                           ngtd  9/26/18                paracentesis               Moderate segs, No bacteria seen  9/18/18                paracentesis fl            MRSA, staph xylosus    Ht Readings from Last 1 Encounters:   09/25/18 5' 4\" (1.626 m)        Wt Readings from Last 1 Encounters:   09/29/18 160 lb 4.8 oz (72.7 kg)         Body mass index is 27.52 kg/m². CrCl: 42.7 mL/min (ideal body weight)    Assessment/Plan:  Good urine output. Creatinine has significantly improved overnight from 2.1 to 1.3 which is near patient's baseline. Will change Vancomycin 1000 mg IVPB Q36H to Vancomycin 1000 mg IVPB Q24H. Recommend drawing trough prior to 3rd or 4th dose of regimen (not ordered yet). Continue to follow.     Parul Shelton, PharmD, [facility]  9/29/2018  11:30 AM

## 2018-09-29 NOTE — PROGRESS NOTES
surfaces  MUSCULOSKELETAL: Movement is coordinated. Moves all extremities   EXTREMITIES: Distal lower extremity temp is warm, Nolower extremity edema. PSYCHIATRIC: mood and affect appropriate. Medications:   Med reviewed  Scheduled Meds:   vancomycin  1,000 mg Intravenous Q24H    [START ON 9/30/2018] pantoprazole  40 mg Oral QAM AC    iron sucrose  250 mg Intravenous Q48H    sodium chloride  1 g Oral TID WC    lactulose  20 g Oral TID    vancomycin (VANCOCIN) intermittent dosing (placeholder)   Other RX Placeholder    insulin lispro  0-6 Units Subcutaneous TID WC    insulin lispro  0-3 Units Subcutaneous Nightly    sodium chloride flush  10 mL Intravenous 2 times per day    docusate sodium  100 mg Oral BID    heparin (porcine)  5,000 Units Subcutaneous Q8H     Continuous Infusions:   0.9% NaCl with KCl 20 mEq 75 mL/hr at 09/29/18 1352    dextrose       Labs:   Labs reviewed  Recent Labs      09/27/18 0518 09/28/18   0527 09/29/18   0534   NA  126*  122*  132*   K  3.9  2.8*  4.2   CL  87*  81*  92*   CO2  20*  24  26   BUN  57*  49*  36*   CREATININE  3.0*  2.1*  1.3*   LABGLOM  16*  25*  43*   GLUCOSE  176*  222*  145*   CALCIUM  7.9*  7.6*  8.5     Recent Labs      09/27/18 0518 09/28/18   0527 09/29/18   0534   WBC  10.8  10.0  9.8   RBC  3.96*  3.72*  3.99*   HGB  10.7*  10.2*  10.7*   HCT  33.8*  31.0*  34.1*   PLT  201  152  183      ASSESSMENT:  1. Acute Kidney Injury 2nd to volume contraction and poor PO intake, Improving  2. Chronic Kidney Disease Stage III  3. Hyponatremia likely 2nd to Cirrhotic splanchnic vasodilation leading to activation of neurohumeral mechanism including RAAS, SANS, and ADH secretion causing intense hyponatremia which is refractory to medical therapy. \Decreas PO salt tabs from 3x to 2x per day. 4. Hypokalemia, resolved, Stop NS with K infusion. Start NS at 30/hr  5. Cirrhosis, Planned paracentesis on Monday  6. Bacterial peritonitis with MRSA  7.  Hx

## 2018-09-30 LAB
ALBUMIN SERPL-MCNC: 1.8 G/DL (ref 3.5–5.1)
ALP BLD-CCNC: 261 U/L (ref 38–126)
ALT SERPL-CCNC: 8 U/L (ref 11–66)
AMMONIA: 101 UMOL/L (ref 11–60)
ANAEROBIC CULTURE: NORMAL
ANION GAP SERPL CALCULATED.3IONS-SCNC: 13 MEQ/L (ref 8–16)
AST SERPL-CCNC: 10 U/L (ref 5–40)
BILIRUB SERPL-MCNC: 1.1 MG/DL (ref 0.3–1.2)
BODY FLUID CULTURE, STERILE: NORMAL
BUN BLDV-MCNC: 29 MG/DL (ref 7–22)
CALCIUM SERPL-MCNC: 8.6 MG/DL (ref 8.5–10.5)
CHLORIDE BLD-SCNC: 94 MEQ/L (ref 98–111)
CO2: 23 MEQ/L (ref 23–33)
CREAT SERPL-MCNC: 0.9 MG/DL (ref 0.4–1.2)
ERYTHROCYTE [DISTWIDTH] IN BLOOD BY AUTOMATED COUNT: 18.5 % (ref 11.5–14.5)
ERYTHROCYTE [DISTWIDTH] IN BLOOD BY AUTOMATED COUNT: 57.2 FL (ref 35–45)
GFR SERPL CREATININE-BSD FRML MDRD: 65 ML/MIN/1.73M2
GLUCOSE BLD-MCNC: 170 MG/DL (ref 70–108)
GLUCOSE BLD-MCNC: 172 MG/DL (ref 70–108)
GLUCOSE BLD-MCNC: 199 MG/DL (ref 70–108)
GLUCOSE BLD-MCNC: 248 MG/DL (ref 70–108)
GLUCOSE BLD-MCNC: 266 MG/DL (ref 70–108)
GRAM STAIN RESULT: NORMAL
HCT VFR BLD CALC: 31.8 % (ref 37–47)
HEMOGLOBIN: 10 GM/DL (ref 12–16)
INR BLD: 1.23 (ref 0.85–1.13)
MCH RBC QN AUTO: 27 PG (ref 26–33)
MCHC RBC AUTO-ENTMCNC: 31.4 GM/DL (ref 32.2–35.5)
MCV RBC AUTO: 85.9 FL (ref 81–99)
PLATELET # BLD: 183 THOU/MM3 (ref 130–400)
PMV BLD AUTO: 9.8 FL (ref 9.4–12.4)
POTASSIUM SERPL-SCNC: 3.7 MEQ/L (ref 3.5–5.2)
RBC # BLD: 3.7 MILL/MM3 (ref 4.2–5.4)
SODIUM BLD-SCNC: 130 MEQ/L (ref 135–145)
TOTAL PROTEIN: 5.2 G/DL (ref 6.1–8)
WBC # BLD: 9.6 THOU/MM3 (ref 4.8–10.8)

## 2018-09-30 PROCEDURE — 82140 ASSAY OF AMMONIA: CPT

## 2018-09-30 PROCEDURE — 6370000000 HC RX 637 (ALT 250 FOR IP): Performed by: INTERNAL MEDICINE

## 2018-09-30 PROCEDURE — 99232 SBSQ HOSP IP/OBS MODERATE 35: CPT | Performed by: INTERNAL MEDICINE

## 2018-09-30 PROCEDURE — 2580000003 HC RX 258: Performed by: INTERNAL MEDICINE

## 2018-09-30 PROCEDURE — 6360000002 HC RX W HCPCS: Performed by: INTERNAL MEDICINE

## 2018-09-30 PROCEDURE — 85027 COMPLETE CBC AUTOMATED: CPT

## 2018-09-30 PROCEDURE — 99999 PR OFFICE/OUTPT VISIT,PROCEDURE ONLY: CPT | Performed by: NURSE PRACTITIONER

## 2018-09-30 PROCEDURE — 82948 REAGENT STRIP/BLOOD GLUCOSE: CPT

## 2018-09-30 PROCEDURE — 6370000000 HC RX 637 (ALT 250 FOR IP): Performed by: NURSE PRACTITIONER

## 2018-09-30 PROCEDURE — 80053 COMPREHEN METABOLIC PANEL: CPT

## 2018-09-30 PROCEDURE — 1200000003 HC TELEMETRY R&B

## 2018-09-30 PROCEDURE — 36415 COLL VENOUS BLD VENIPUNCTURE: CPT

## 2018-09-30 PROCEDURE — 85610 PROTHROMBIN TIME: CPT

## 2018-09-30 PROCEDURE — 87205 SMEAR GRAM STAIN: CPT

## 2018-09-30 PROCEDURE — APPSS30 APP SPLIT SHARED TIME 16-30 MINUTES: Performed by: NURSE PRACTITIONER

## 2018-09-30 RX ORDER — FUROSEMIDE 20 MG/1
20 TABLET ORAL ONCE
Status: COMPLETED | OUTPATIENT
Start: 2018-09-30 | End: 2018-09-30

## 2018-09-30 RX ADMIN — HEPARIN SODIUM 5000 UNITS: 5000 INJECTION INTRAVENOUS; SUBCUTANEOUS at 09:04

## 2018-09-30 RX ADMIN — SODIUM CHLORIDE TAB 1 GM 1 G: 1 TAB at 16:30

## 2018-09-30 RX ADMIN — Medication 2 UNITS: at 16:30

## 2018-09-30 RX ADMIN — Medication 1 UNITS: at 09:04

## 2018-09-30 RX ADMIN — Medication 1 UNITS: at 11:23

## 2018-09-30 RX ADMIN — OXYCODONE HYDROCHLORIDE 5 MG: 5 TABLET ORAL at 22:02

## 2018-09-30 RX ADMIN — INSULIN LISPRO 2 UNITS: 100 INJECTION, SOLUTION INTRAVENOUS; SUBCUTANEOUS at 22:02

## 2018-09-30 RX ADMIN — LACTULOSE 20 G: 10 SOLUTION ORAL at 09:04

## 2018-09-30 RX ADMIN — HEPARIN SODIUM 5000 UNITS: 5000 INJECTION INTRAVENOUS; SUBCUTANEOUS at 23:39

## 2018-09-30 RX ADMIN — OXYCODONE HYDROCHLORIDE 5 MG: 5 TABLET ORAL at 15:24

## 2018-09-30 RX ADMIN — LACTULOSE 20 G: 10 SOLUTION ORAL at 22:02

## 2018-09-30 RX ADMIN — FUROSEMIDE 20 MG: 20 TABLET ORAL at 15:24

## 2018-09-30 RX ADMIN — HEPARIN SODIUM 5000 UNITS: 5000 INJECTION INTRAVENOUS; SUBCUTANEOUS at 00:13

## 2018-09-30 RX ADMIN — OXYCODONE HYDROCHLORIDE 5 MG: 5 TABLET ORAL at 02:55

## 2018-09-30 RX ADMIN — IRON SUCROSE 250 MG: 20 INJECTION, SOLUTION INTRAVENOUS at 11:19

## 2018-09-30 RX ADMIN — SODIUM CHLORIDE TAB 1 GM 1 G: 1 TAB at 09:04

## 2018-09-30 RX ADMIN — PANTOPRAZOLE SODIUM 40 MG: 40 TABLET, DELAYED RELEASE ORAL at 05:17

## 2018-09-30 RX ADMIN — HEPARIN SODIUM 5000 UNITS: 5000 INJECTION INTRAVENOUS; SUBCUTANEOUS at 16:30

## 2018-09-30 RX ADMIN — VANCOMYCIN HYDROCHLORIDE 1000 MG: 1 INJECTION, POWDER, LYOPHILIZED, FOR SOLUTION INTRAVENOUS at 13:49

## 2018-09-30 RX ADMIN — OXYCODONE HYDROCHLORIDE 5 MG: 5 TABLET ORAL at 09:04

## 2018-09-30 RX ADMIN — LACTULOSE 20 G: 10 SOLUTION ORAL at 13:53

## 2018-09-30 RX ADMIN — Medication 10 ML: at 22:03

## 2018-09-30 ASSESSMENT — PAIN SCALES - GENERAL
PAINLEVEL_OUTOF10: 7
PAINLEVEL_OUTOF10: 6
PAINLEVEL_OUTOF10: 7
PAINLEVEL_OUTOF10: 7

## 2018-09-30 ASSESSMENT — PAIN DESCRIPTION - PAIN TYPE
TYPE: CHRONIC PAIN

## 2018-09-30 ASSESSMENT — PAIN DESCRIPTION - LOCATION
LOCATION: ABDOMEN

## 2018-09-30 NOTE — PROGRESS NOTES
input(s): CHOL, TRIG, HDL, LDLCALC in the last 72 hours. Invalid input(s): LDL  PT/INR:     Recent Labs      09/30/18   0543   INR  1.23*     PTT:   No results for input(s): APTT, PTT in the last 72 hours. Olga Quintanilla primary peritonitis  Cirrhosis of liver  Low na  sarcodosis        P.  Cont atb  Gram stain and c/s on next ascitic tap  Eventually needs liver transplant eval

## 2018-10-01 ENCOUNTER — APPOINTMENT (OUTPATIENT)
Dept: INTERVENTIONAL RADIOLOGY/VASCULAR | Age: 53
DRG: 371 | End: 2018-10-01
Payer: COMMERCIAL

## 2018-10-01 ENCOUNTER — APPOINTMENT (OUTPATIENT)
Dept: ULTRASOUND IMAGING | Age: 53
DRG: 371 | End: 2018-10-01
Payer: COMMERCIAL

## 2018-10-01 LAB
ALBUMIN FLUID: 0.6 GM/DL
ALBUMIN SERPL-MCNC: 2.1 G/DL (ref 3.5–5.1)
ALP BLD-CCNC: 271 U/L (ref 38–126)
ALT SERPL-CCNC: 9 U/L (ref 11–66)
AMYLASE FLUID: 5 U/L
ANION GAP SERPL CALCULATED.3IONS-SCNC: 13 MEQ/L (ref 8–16)
AST SERPL-CCNC: 11 U/L (ref 5–40)
BILIRUB SERPL-MCNC: 1.1 MG/DL (ref 0.3–1.2)
BLOOD CULTURE, ROUTINE: NORMAL
BLOOD CULTURE, ROUTINE: NORMAL
BODY FLUID RBC: 6000 /CUMM
BUN BLDV-MCNC: 24 MG/DL (ref 7–22)
CALCIUM SERPL-MCNC: 8.9 MG/DL (ref 8.5–10.5)
CHARACTER, BODY FLUID: NORMAL
CHLORIDE BLD-SCNC: 94 MEQ/L (ref 98–111)
CO2: 23 MEQ/L (ref 23–33)
COLOR: NORMAL
CREAT SERPL-MCNC: 0.8 MG/DL (ref 0.4–1.2)
ERYTHROCYTE [DISTWIDTH] IN BLOOD BY AUTOMATED COUNT: 18.7 % (ref 11.5–14.5)
ERYTHROCYTE [DISTWIDTH] IN BLOOD BY AUTOMATED COUNT: 56.1 FL (ref 35–45)
GFR SERPL CREATININE-BSD FRML MDRD: 75 ML/MIN/1.73M2
GLUCOSE BLD-MCNC: 180 MG/DL (ref 70–108)
GLUCOSE BLD-MCNC: 188 MG/DL (ref 70–108)
GLUCOSE BLD-MCNC: 189 MG/DL (ref 70–108)
GLUCOSE BLD-MCNC: 281 MG/DL (ref 70–108)
GLUCOSE BLD-MCNC: 296 MG/DL (ref 70–108)
HCT VFR BLD CALC: 31.1 % (ref 37–47)
HEMOGLOBIN: 10 GM/DL (ref 12–16)
INR BLD: 1.23 (ref 0.85–1.13)
MCH RBC QN AUTO: 27.2 PG (ref 26–33)
MCHC RBC AUTO-ENTMCNC: 32.2 GM/DL (ref 32.2–35.5)
MCV RBC AUTO: 84.7 FL (ref 81–99)
MONONUCLEAR CELLS BODY FLUID: 72.8 %
PATHOLOGIST REVIEW: NORMAL
PLATELET # BLD: 177 THOU/MM3 (ref 130–400)
PMV BLD AUTO: 9.5 FL (ref 9.4–12.4)
POLYMORPHONUCLEAR CELLS BODY FLUID: 27.2 %
POTASSIUM SERPL-SCNC: 3.6 MEQ/L (ref 3.5–5.2)
RBC # BLD: 3.67 MILL/MM3 (ref 4.2–5.4)
SODIUM BLD-SCNC: 130 MEQ/L (ref 135–145)
SPECIMEN: NORMAL
TOTAL NUCLEATED CELLS BODY FLUID: 208 /CUMM (ref 0–500)
TOTAL PROTEIN: 5.5 G/DL (ref 6.1–8)
TOTAL VOLUME RECEIVED BODY FLUID: 68 ML
WBC # BLD: 10 THOU/MM3 (ref 4.8–10.8)

## 2018-10-01 PROCEDURE — 1200000003 HC TELEMETRY R&B

## 2018-10-01 PROCEDURE — 36415 COLL VENOUS BLD VENIPUNCTURE: CPT

## 2018-10-01 PROCEDURE — 85027 COMPLETE CBC AUTOMATED: CPT

## 2018-10-01 PROCEDURE — 87070 CULTURE OTHR SPECIMN AEROBIC: CPT

## 2018-10-01 PROCEDURE — 6360000002 HC RX W HCPCS: Performed by: INTERNAL MEDICINE

## 2018-10-01 PROCEDURE — 93971 EXTREMITY STUDY: CPT

## 2018-10-01 PROCEDURE — 89050 BODY FLUID CELL COUNT: CPT

## 2018-10-01 PROCEDURE — 85610 PROTHROMBIN TIME: CPT

## 2018-10-01 PROCEDURE — 6370000000 HC RX 637 (ALT 250 FOR IP): Performed by: INTERNAL MEDICINE

## 2018-10-01 PROCEDURE — 6370000000 HC RX 637 (ALT 250 FOR IP): Performed by: NURSE PRACTITIONER

## 2018-10-01 PROCEDURE — 82948 REAGENT STRIP/BLOOD GLUCOSE: CPT

## 2018-10-01 PROCEDURE — 99232 SBSQ HOSP IP/OBS MODERATE 35: CPT | Performed by: INTERNAL MEDICINE

## 2018-10-01 PROCEDURE — 2580000003 HC RX 258: Performed by: INTERNAL MEDICINE

## 2018-10-01 PROCEDURE — 49083 ABD PARACENTESIS W/IMAGING: CPT

## 2018-10-01 PROCEDURE — 87075 CULTR BACTERIA EXCEPT BLOOD: CPT

## 2018-10-01 PROCEDURE — 82042 OTHER SOURCE ALBUMIN QUAN EA: CPT

## 2018-10-01 PROCEDURE — 88305 TISSUE EXAM BY PATHOLOGIST: CPT

## 2018-10-01 PROCEDURE — 80053 COMPREHEN METABOLIC PANEL: CPT

## 2018-10-01 PROCEDURE — 82150 ASSAY OF AMYLASE: CPT

## 2018-10-01 PROCEDURE — 88112 CYTOPATH CELL ENHANCE TECH: CPT

## 2018-10-01 PROCEDURE — 0W9G3ZZ DRAINAGE OF PERITONEAL CAVITY, PERCUTANEOUS APPROACH: ICD-10-PCS | Performed by: RADIOLOGY

## 2018-10-01 RX ADMIN — INSULIN LISPRO 2 UNITS: 100 INJECTION, SOLUTION INTRAVENOUS; SUBCUTANEOUS at 21:22

## 2018-10-01 RX ADMIN — Medication 10 ML: at 21:21

## 2018-10-01 RX ADMIN — HEPARIN SODIUM 5000 UNITS: 5000 INJECTION INTRAVENOUS; SUBCUTANEOUS at 16:41

## 2018-10-01 RX ADMIN — Medication 3 UNITS: at 16:42

## 2018-10-01 RX ADMIN — LACTULOSE 20 G: 10 SOLUTION ORAL at 10:04

## 2018-10-01 RX ADMIN — SODIUM CHLORIDE TAB 1 GM 1 G: 1 TAB at 10:02

## 2018-10-01 RX ADMIN — OXYCODONE HYDROCHLORIDE 5 MG: 5 TABLET ORAL at 17:40

## 2018-10-01 RX ADMIN — Medication 1 UNITS: at 13:19

## 2018-10-01 RX ADMIN — PANTOPRAZOLE SODIUM 40 MG: 40 TABLET, DELAYED RELEASE ORAL at 04:56

## 2018-10-01 RX ADMIN — Medication 10 ML: at 10:05

## 2018-10-01 RX ADMIN — DOCUSATE SODIUM 100 MG: 100 CAPSULE, LIQUID FILLED ORAL at 21:21

## 2018-10-01 RX ADMIN — OXYCODONE HYDROCHLORIDE 5 MG: 5 TABLET ORAL at 10:53

## 2018-10-01 RX ADMIN — Medication 1 UNITS: at 10:05

## 2018-10-01 RX ADMIN — HEPARIN SODIUM 5000 UNITS: 5000 INJECTION INTRAVENOUS; SUBCUTANEOUS at 23:31

## 2018-10-01 RX ADMIN — SODIUM CHLORIDE TAB 1 GM 1 G: 1 TAB at 16:41

## 2018-10-01 RX ADMIN — OXYCODONE HYDROCHLORIDE 5 MG: 5 TABLET ORAL at 04:56

## 2018-10-01 RX ADMIN — VANCOMYCIN HYDROCHLORIDE 1000 MG: 1 INJECTION, POWDER, LYOPHILIZED, FOR SOLUTION INTRAVENOUS at 13:49

## 2018-10-01 ASSESSMENT — PAIN SCALES - GENERAL
PAINLEVEL_OUTOF10: 6
PAINLEVEL_OUTOF10: 5
PAINLEVEL_OUTOF10: 6
PAINLEVEL_OUTOF10: 6
PAINLEVEL_OUTOF10: 7
PAINLEVEL_OUTOF10: 7
PAINLEVEL_OUTOF10: 5
PAINLEVEL_OUTOF10: 7

## 2018-10-01 ASSESSMENT — PAIN DESCRIPTION - LOCATION
LOCATION: ABDOMEN
LOCATION: ABDOMEN

## 2018-10-01 ASSESSMENT — PAIN DESCRIPTION - PAIN TYPE
TYPE: ACUTE PAIN
TYPE: CHRONIC PAIN
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
## 2018-10-01 NOTE — PROGRESS NOTES
Subcutaneous Q8H      dextrose       glucose, dextrose, glucagon (rDNA), dextrose, ondansetron, oxyCODONE, sodium chloride flush, ondansetron      LABS:     CBC:   Recent Labs      09/29/18   0534  09/30/18   0543  10/01/18   0623   WBC  9.8  9.6  10.0   HGB  10.7*  10.0*  10.0*   PLT  183  183  177     BMP:    Recent Labs      09/29/18   0534  09/30/18   0543  10/01/18   0623   NA  132*  130*  130*   K  4.2  3.7  3.6   CL  92*  94*  94*   CO2  26  23  23   BUN  36*  29*  24*   CREATININE  1.3*  0.9  0.8   GLUCOSE  145*  170*  180*     Calcium:  Recent Labs      10/01/18   0623   CALCIUM  8.9    Glucose:  Recent Labs      09/30/18   1945  10/01/18   0611  10/01/18   1114   POCGLU  266*  188*  189*     HgbA1C:   No results for input(s): LABA1C in the last 72 hours. INR:   Recent Labs      09/30/18   0543  10/01/18   0623   INR  1.23*  1.23*     Hepatic:   Recent Labs      09/29/18   0534  09/30/18   0543  10/01/18   0623   ALKPHOS  261*  261*  271*   ALT  9*  8*  9*   AST  12  10  11   PROT  5.4*  5.2*  5.5*   BILITOT  1.7*  1.1  1.1   LABALBU  2.1*  1.8*  2.1*     Amylase and Lipase:  No results for input(s): LACTA, AMYLASE in the last 72 hours. Lactic Acid:   No results for input(s): LACTA in the last 72 hours.      CULTURES:   UA:   Recent Labs      10/01/18   0845   COLORU  LIGHT ORANGE     Micro:   Lab Results   Component Value Date    BC No growth-preliminary  No growth   09/25/2018         IMAGING:         Problem list of patient:     Patient Active Problem List   Diagnosis Code    SBP (spontaneous bacterial peritonitis) ([facility] K65.2    Iron deficiency anemia due to chronic blood loss D50.0    Severe malnutrition (HCC) E43    Other acute kidney failure ([facility] N17.8    Hyponatremia E87.1    Hypokalemia E87.6         ASSESSMENT/PLAN   SBP due to MRSA: repeat gram stain is negative  Cirrhosis of the liver  Continue iv vancomycin  Doppler left lower leg to rule out DVT>  Gregorystephani Padilla MD, [address] 10/1/2018 4:19 PM

## 2018-10-01 NOTE — PLAN OF CARE
Problem: Pain:  Goal: Pain level will decrease  Pain level will decrease   Outcome: Ongoing  Pt noted 7 out of 10 pain in her abdomen this shift. Pain medicine given when pt requests. Will continue to monitor pain and manage appropriately. Problem: Falls - Risk of:  Goal: Will remain free from falls  Will remain free from falls   Outcome: Ongoing  Pt free from falls this shift. Nonskid socks available, 2/4 side rails up, bed alarm on, call light in reach, clear pathway, bed in locked and lowest position. Will continue to monitor. Goal: Absence of physical injury  Absence of physical injury   Outcome: Ongoing  Pt free from physical injury this shift. Nonskid socks available, 2/4 side rails up, bed alarm on, call light in reach, clear pathway, bed in locked and lowest position. Will continue to monitor. Problem: Nutrition  Goal: Optimal nutrition therapy  Outcome: Ongoing  Pt stated she was able to eat about half of evening meal. Will continue to monitor nutritional intake. Problem: Discharge Planning:  Goal: Discharged to appropriate level of care  Discharged to appropriate level of care   Outcome: Ongoing  Pt plans to be discharged to home. Will continue to monitor for further discharge needs. Problem: Daily Care:  Goal: Daily care needs are met  Daily care needs are met   Outcome: Ongoing  Pt daily care needs are met through purposeful hourly rounding. Will continue to monitor for needs. Comments: Care plan reviewed with patient. Patient verbalizes understanding of the plan of care and contribute to goal setting.

## 2018-10-01 NOTE — FLOWSHEET NOTE
[facility]  PHYSICAL THERAPY MISSED TREATMENT NOTE  ACUTE CARE  [facility] RENAL TELEMETRY 6K          pt declined therapy at time of attempt d/t pain level; PTA informed nurse of pts pain level and request for pain meds; PTA will try back later today if able     Missed Treatment  Laura Banuelos PTA [phone]

## 2018-10-02 LAB
ALBUMIN SERPL-MCNC: 2 G/DL (ref 3.5–5.1)
ALP BLD-CCNC: 257 U/L (ref 38–126)
ALT SERPL-CCNC: 8 U/L (ref 11–66)
AMMONIA: 47 UMOL/L (ref 11–60)
ANION GAP SERPL CALCULATED.3IONS-SCNC: 12 MEQ/L (ref 8–16)
AST SERPL-CCNC: 10 U/L (ref 5–40)
BILIRUB SERPL-MCNC: 1.1 MG/DL (ref 0.3–1.2)
BUN BLDV-MCNC: 22 MG/DL (ref 7–22)
CALCIUM SERPL-MCNC: 9 MG/DL (ref 8.5–10.5)
CHLORIDE BLD-SCNC: 92 MEQ/L (ref 98–111)
CO2: 23 MEQ/L (ref 23–33)
CREAT SERPL-MCNC: 0.8 MG/DL (ref 0.4–1.2)
ERYTHROCYTE [DISTWIDTH] IN BLOOD BY AUTOMATED COUNT: 18.7 % (ref 11.5–14.5)
ERYTHROCYTE [DISTWIDTH] IN BLOOD BY AUTOMATED COUNT: 56.8 FL (ref 35–45)
GFR SERPL CREATININE-BSD FRML MDRD: 75 ML/MIN/1.73M2
GLUCOSE BLD-MCNC: 216 MG/DL (ref 70–108)
GLUCOSE BLD-MCNC: 217 MG/DL (ref 70–108)
GLUCOSE BLD-MCNC: 224 MG/DL (ref 70–108)
GLUCOSE BLD-MCNC: 226 MG/DL (ref 70–108)
GLUCOSE BLD-MCNC: 237 MG/DL (ref 70–108)
HCT VFR BLD CALC: 30.6 % (ref 37–47)
HEMOGLOBIN: 9.6 GM/DL (ref 12–16)
MCH RBC QN AUTO: 27 PG (ref 26–33)
MCHC RBC AUTO-ENTMCNC: 31.4 GM/DL (ref 32.2–35.5)
MCV RBC AUTO: 86.2 FL (ref 81–99)
PLATELET # BLD: 172 THOU/MM3 (ref 130–400)
PMV BLD AUTO: 10.1 FL (ref 9.4–12.4)
POTASSIUM SERPL-SCNC: 4.2 MEQ/L (ref 3.5–5.2)
RBC # BLD: 3.55 MILL/MM3 (ref 4.2–5.4)
SODIUM BLD-SCNC: 127 MEQ/L (ref 135–145)
TOTAL PROTEIN: 5.1 G/DL (ref 6.1–8)
VANCOMYCIN TROUGH: 40.1 UG/ML (ref 5–15)
WBC # BLD: 10.1 THOU/MM3 (ref 4.8–10.8)
ZINC: 40 UG/DL (ref 60–120)

## 2018-10-02 PROCEDURE — 80053 COMPREHEN METABOLIC PANEL: CPT

## 2018-10-02 PROCEDURE — 6360000002 HC RX W HCPCS: Performed by: INTERNAL MEDICINE

## 2018-10-02 PROCEDURE — 85027 COMPLETE CBC AUTOMATED: CPT

## 2018-10-02 PROCEDURE — 82140 ASSAY OF AMMONIA: CPT

## 2018-10-02 PROCEDURE — 80202 ASSAY OF VANCOMYCIN: CPT

## 2018-10-02 PROCEDURE — 05H733Z INSERTION OF INFUSION DEVICE INTO RIGHT AXILLARY VEIN, PERCUTANEOUS APPROACH: ICD-10-PCS | Performed by: INTERNAL MEDICINE

## 2018-10-02 PROCEDURE — 6370000000 HC RX 637 (ALT 250 FOR IP): Performed by: INTERNAL MEDICINE

## 2018-10-02 PROCEDURE — P9047 ALBUMIN (HUMAN), 25%, 50ML: HCPCS | Performed by: INTERNAL MEDICINE

## 2018-10-02 PROCEDURE — 2580000003 HC RX 258: Performed by: INTERNAL MEDICINE

## 2018-10-02 PROCEDURE — 36415 COLL VENOUS BLD VENIPUNCTURE: CPT

## 2018-10-02 PROCEDURE — 76937 US GUIDE VASCULAR ACCESS: CPT

## 2018-10-02 PROCEDURE — 97116 GAIT TRAINING THERAPY: CPT

## 2018-10-02 PROCEDURE — 36569 INSJ PICC 5 YR+ W/O IMAGING: CPT

## 2018-10-02 PROCEDURE — C1751 CATH, INF, PER/CENT/MIDLINE: HCPCS

## 2018-10-02 PROCEDURE — 99232 SBSQ HOSP IP/OBS MODERATE 35: CPT | Performed by: INTERNAL MEDICINE

## 2018-10-02 PROCEDURE — 2500000003 HC RX 250 WO HCPCS: Performed by: INTERNAL MEDICINE

## 2018-10-02 PROCEDURE — 6370000000 HC RX 637 (ALT 250 FOR IP): Performed by: NURSE PRACTITIONER

## 2018-10-02 PROCEDURE — 97535 SELF CARE MNGMENT TRAINING: CPT

## 2018-10-02 PROCEDURE — 1200000003 HC TELEMETRY R&B

## 2018-10-02 PROCEDURE — 82948 REAGENT STRIP/BLOOD GLUCOSE: CPT

## 2018-10-02 PROCEDURE — 97110 THERAPEUTIC EXERCISES: CPT

## 2018-10-02 RX ORDER — ALBUMIN (HUMAN) 12.5 G/50ML
12.5 SOLUTION INTRAVENOUS EVERY 6 HOURS
Status: COMPLETED | OUTPATIENT
Start: 2018-10-02 | End: 2018-10-03

## 2018-10-02 RX ORDER — SODIUM CHLORIDE 0.9 % (FLUSH) 0.9 %
10 SYRINGE (ML) INJECTION PRN
Status: DISCONTINUED | OUTPATIENT
Start: 2018-10-02 | End: 2018-10-04 | Stop reason: HOSPADM

## 2018-10-02 RX ORDER — LACTULOSE 10 G/15ML
20 SOLUTION ORAL 2 TIMES DAILY
Status: DISCONTINUED | OUTPATIENT
Start: 2018-10-02 | End: 2018-10-04 | Stop reason: HOSPADM

## 2018-10-02 RX ORDER — SODIUM CHLORIDE 1000 MG
1 TABLET, SOLUBLE MISCELLANEOUS
Status: DISCONTINUED | OUTPATIENT
Start: 2018-10-02 | End: 2018-10-04 | Stop reason: HOSPADM

## 2018-10-02 RX ORDER — SODIUM CHLORIDE 0.9 % (FLUSH) 0.9 %
10 SYRINGE (ML) INJECTION EVERY 12 HOURS SCHEDULED
Status: DISCONTINUED | OUTPATIENT
Start: 2018-10-02 | End: 2018-10-04 | Stop reason: HOSPADM

## 2018-10-02 RX ORDER — LIDOCAINE HYDROCHLORIDE 10 MG/ML
5 INJECTION, SOLUTION EPIDURAL; INFILTRATION; INTRACAUDAL; PERINEURAL ONCE
Status: COMPLETED | OUTPATIENT
Start: 2018-10-02 | End: 2018-10-02

## 2018-10-02 RX ADMIN — HEPARIN SODIUM 5000 UNITS: 5000 INJECTION INTRAVENOUS; SUBCUTANEOUS at 23:05

## 2018-10-02 RX ADMIN — RIFAXIMIN 550 MG: 550 TABLET ORAL at 13:03

## 2018-10-02 RX ADMIN — Medication 10 ML: at 10:06

## 2018-10-02 RX ADMIN — ALBUMIN (HUMAN) 12.5 G: 0.25 INJECTION, SOLUTION INTRAVENOUS at 19:33

## 2018-10-02 RX ADMIN — OXYCODONE HYDROCHLORIDE 5 MG: 5 TABLET ORAL at 13:03

## 2018-10-02 RX ADMIN — LIDOCAINE HYDROCHLORIDE 5 ML: 10 INJECTION, SOLUTION EPIDURAL; INFILTRATION; INTRACAUDAL; PERINEURAL at 17:50

## 2018-10-02 RX ADMIN — LACTULOSE 20 G: 10 SOLUTION ORAL at 10:05

## 2018-10-02 RX ADMIN — Medication 2 UNITS: at 13:17

## 2018-10-02 RX ADMIN — Medication 2 UNITS: at 17:55

## 2018-10-02 RX ADMIN — ALBUMIN (HUMAN) 12.5 G: 0.25 INJECTION, SOLUTION INTRAVENOUS at 13:03

## 2018-10-02 RX ADMIN — Medication 2 UNITS: at 10:03

## 2018-10-02 RX ADMIN — DOCUSATE SODIUM 100 MG: 100 CAPSULE, LIQUID FILLED ORAL at 23:04

## 2018-10-02 RX ADMIN — HEPARIN SODIUM 5000 UNITS: 5000 INJECTION INTRAVENOUS; SUBCUTANEOUS at 17:55

## 2018-10-02 RX ADMIN — OXYCODONE HYDROCHLORIDE 5 MG: 5 TABLET ORAL at 20:15

## 2018-10-02 RX ADMIN — HEPARIN SODIUM 5000 UNITS: 5000 INJECTION INTRAVENOUS; SUBCUTANEOUS at 10:03

## 2018-10-02 RX ADMIN — PANTOPRAZOLE SODIUM 40 MG: 40 TABLET, DELAYED RELEASE ORAL at 05:49

## 2018-10-02 RX ADMIN — SODIUM CHLORIDE TAB 1 GM 1 G: 1 TAB at 10:05

## 2018-10-02 RX ADMIN — VANCOMYCIN HYDROCHLORIDE 1000 MG: 1 INJECTION, POWDER, LYOPHILIZED, FOR SOLUTION INTRAVENOUS at 13:42

## 2018-10-02 RX ADMIN — INSULIN LISPRO 1 UNITS: 100 INJECTION, SOLUTION INTRAVENOUS; SUBCUTANEOUS at 23:05

## 2018-10-02 RX ADMIN — LACTULOSE 20 G: 20 SOLUTION ORAL at 23:04

## 2018-10-02 RX ADMIN — OXYCODONE HYDROCHLORIDE 5 MG: 5 TABLET ORAL at 05:49

## 2018-10-02 RX ADMIN — SODIUM CHLORIDE TAB 1 GM 1 G: 1 TAB at 17:55

## 2018-10-02 RX ADMIN — RIFAXIMIN 550 MG: 550 TABLET ORAL at 23:04

## 2018-10-02 ASSESSMENT — PAIN SCALES - GENERAL
PAINLEVEL_OUTOF10: 7
PAINLEVEL_OUTOF10: 6

## 2018-10-02 ASSESSMENT — PAIN DESCRIPTION - PAIN TYPE: TYPE: ACUTE PAIN

## 2018-10-02 ASSESSMENT — PAIN DESCRIPTION - LOCATION: LOCATION: ABDOMEN

## 2018-10-02 NOTE — PROGRESS NOTES
INTERNAL MEDICINE Progress Note  10/2/2018 10:38 AM  Subjective:   Admit Date: 9/25/2018  PCP: Mirta Poole MD  Interval History:   C/o edema left leg, doppler left leg negative for DVT  Improved abd pain,   no nausea, no vomiting    Objective:   Vitals: BP (!) 106/52   Pulse 80   Temp 98.5 °F (36.9 °C) (Oral)   Resp 18   Ht 5' 4\" (1.626 m)   Wt 158 lb (71.7 kg)   SpO2 94%   BMI 27.12 kg/m²   General appearance: alert and cooperative with exam  HEENT:  atraumatic  Neck:  no JVD  Lungs: diminished breath sounds bibasilar  Heart: S1, S2 normal  Abdomen: distended, mildly tender abd diffusely, no masses  Extremities: left leg edema,   Neurologic: Alert, oriented, thought content appropriate      Medications:   Scheduled Meds:   rifaximin  550 mg Oral BID    lactulose  20 g Oral BID    vancomycin  1,000 mg Intravenous Q24H    pantoprazole  40 mg Oral QAM AC    sodium chloride  1 g Oral BID WC    iron sucrose  250 mg Intravenous Q48H    vancomycin (VANCOCIN) intermittent dosing (placeholder)   Other RX Placeholder    insulin lispro  0-6 Units Subcutaneous TID WC    insulin lispro  0-3 Units Subcutaneous Nightly    sodium chloride flush  10 mL Intravenous 2 times per day    docusate sodium  100 mg Oral BID    heparin (porcine)  5,000 Units Subcutaneous Q8H     Continuous Infusions:   dextrose         Lab Results:   CBC:   Recent Labs      09/30/18   0543  10/01/18   0623  10/02/18   0633   WBC  9.6  10.0  10.1   HGB  10.0*  10.0*  9.6*   PLT  183  177  172     BMP:    Recent Labs      09/30/18   0543  10/01/18   0623  10/02/18   0633   NA  130*  130*  127*   K  3.7  3.6  4.2   CL  94*  94*  92*   CO2  23  23  23   BUN  29*  24*  22   CREATININE  0.9  0.8  0.8   GLUCOSE  170*  180*  217*     Hepatic:   Recent Labs      09/30/18   0543  10/01/18   0623  10/02/18   0633   AST  10  11  10   ALT  8*  9*  8*   BILITOT  1.1  1.1  1.1   ALKPHOS  261*  271*  257*     AFP-Tumor Marker 1.3  <8.4 ug/L   INR:

## 2018-10-02 NOTE — PROGRESS NOTES
Assessment  Patient Currently in Pain: Yes  Pain Level: 7  Pain Type: Acute pain  Pain Location: Abdomen       Objective  Overall Cognitive Status: Exceptions               ADL  Grooming: Supervision          Bed mobility  Supine to Sit: Supervision  Sit to Supine: Supervision    Transfers  Sit to stand: Stand by assistance  Stand to sit: Stand by assistance  Toilet Transfers  Toilet - Technique: Ambulating  Equipment Used: Grab bars  Toilet Transfer: Stand by assistance    Balance  Sitting Balance: Modified independent  (seated EOB)  Standing Balance: Stand by assistance     Time: x2 minutes at sink  Activity: grooming     Functional Mobility  Functional - Mobility Device: Cane  Activity: To/from bathroom  Assist Level: Stand by assistance  Functional Mobility Comments: Pt. ambulated to and from BR with cane no LOB             Activity Tolerance:  Activity Tolerance: Patient Tolerated treatment well  Activity Tolerance: Asking \"can I be done now, I did what you wanted\"    Assessment:     Performance deficits / Impairments: Decreased functional mobility , Decreased ADL status, Decreased strength, Decreased safe awareness, Decreased cognition, Decreased high-level IADLs, Decreased balance, Decreased endurance  Prognosis: Good, Fair    Discharge Recommendations:  Discharge Recommendations: Continue to assess pending progress, 24 hour supervision or assist, Patient would benefit from continued therapy after discharge, Home with Home health OT, [address] (pending progress: at this time, pt unsteady requiring 24 hour assist for safety/fall prevention, should pt improve, anticipate pt d/c home [facility])    Patient Education:  Patient Education: Role of OT, importance of OOB activity  Barriers to Learning: cognition/emotions    Equipment Recommendations: Other: continue to monitor    Safety:  Safety Devices in place: Yes  Type of devices:  All fall risk precautions in place, Left

## 2018-10-02 NOTE — PROGRESS NOTES
Physical Therapy   [address] [id number]  INPATIENT PHYSICAL THERAPY  DAILYNOTE  [facility] RENAL TELEMETRY 6K - 6K-16/016-A    Time In: 1240  Time Out: 1304  Timed Code Treatment Minutes: 24 Minutes  Minutes: 24          Date: 10/2/2018  Patient Name: Mike Stoner,  Gender:  female        MRN: [id number]  : 1965  (46 y.o.)  Referral Date : 18  Referring Practitioner: Ancelmo Gomez MD  Diagnosis: SBP (spontaneous bacterial peritonitis)  Additional Pertinent Hx: 46 y.o. female who presents  To the emergency department for evaluation after she was recalled from home because ascitic fluid Was abnormal.  Patient said that she was called  and she was informed that she has MRSA in her ascites fluid. Past Medical History:   Diagnosis Date    Arthritis     Asthma     CAD (coronary artery disease)     Diabetes mellitus ([facility]     GERD (gastroesophageal reflux disease)     Hyperlipidemia     Liver disease     Other disorders of kidney and ureter in diseases classified elsewhere     Severe malnutrition ([facility] 2018     Past Surgical History:   Procedure Laterality Date    CHOLECYSTECTOMY      COLONOSCOPY      CORONARY ANGIOPLASTY WITH STENT PLACEMENT      ELBOW SURGERY Right     ENDOSCOPY, COLON, DIAGNOSTIC      HERNIA REPAIR         Restrictions/Precautions:  Contact Precautions, General Precautions, Fall Risk                            Prior Level of Function:  ADL Assistance: Independent (pt reports difficulty with pulling pants up)  Homemaking Assistance: Needs assistance (dtr does all)  Ambulation Assistance: Independent (with cane)  Transfer Assistance: Independent  Additional Comments: Pt recently moved to [place] from [place] to live with her daughter (and son in law and grandchildren) d/t health concerns in April. Subjective:     Subjective: RN approved session, this is second attempt to see pt.  Pt resting in bed at arrival. pt needing mod encouragement to participate in

## 2018-10-02 NOTE — CARE COORDINATION
10/2/18, 2:42 PM    DISCHARGE BARRIERS    Check back with patient and she is currently working with therapy. She stated that she has not spoken to her daughter yet or picked a facility. Patient does have a list and stated that she is going to show everyone that she can go home. Will check back tomorrow.

## 2018-10-02 NOTE — PROGRESS NOTES
ml/min   Current eGFR 75 ml/min       eGFR trend    Lab Results   Component Value Date    LABGLOM 75 10/02/2018    LABGLOM 75 10/01/2018    LABGLOM 65 09/30/2018    LABGLOM 43 09/29/2018    LABGLOM 25 09/28/2018    LABGLOM 16 09/27/2018    LABGLOM 14 09/26/2018    LABGLOM 14 09/25/2018          Compliance with treatment plan: 100%     Allergies and Intolerances: ALLERGIES: Prednisone; Morphine; Amoxicillin; Erythromycin; and Pcn [penicillins]  MEDICATION INTOLERANCES: none  FOOD ALLERGIES: none  INSECT ALLERGIES: none  PLANT ALLERGIES: none     Past Medical History:  Past Medical History:   Diagnosis Date    Arthritis     Asthma     CAD (coronary artery disease)     Diabetes mellitus (HCC)     GERD (gastroesophageal reflux disease)     Hyperlipidemia     Liver disease     Other disorders of kidney and ureter in diseases classified elsewhere     Severe malnutrition ([facility] [facility] 09/26/2018        Past Surgical History:  Past Surgical History:   Procedure Laterality Date    CHOLECYSTECTOMY      COLONOSCOPY      CORONARY ANGIOPLASTY WITH STENT PLACEMENT      ELBOW SURGERY Right     ENDOSCOPY, COLON, DIAGNOSTIC      HERNIA REPAIR          Family History:  Family History   Problem Relation Age of Onset    Cancer Mother     Heart Disease Father         Social History:  Social History     Social History    Marital status:      Spouse name: N/A    Number of children: N/A    Years of education: N/A     Occupational History    Not on file. Social History Main Topics    Smoking status: Former Smoker     Packs/day: 1.00     Quit date: 1998    Smokeless tobacco: Never Used    Alcohol use No    Drug use: Unknown    Sexual activity: Not on file     Other Topics Concern    Not on file     Social History Narrative    No narrative on file        Home Medications:  Prior to Admission medications    Medication Sig Start Date End Date Taking?  Authorizing Provider   metoprolol tartrate (LOPRESSOR) 25 MG tablet Take 12.5 mg by mouth 2 times daily    Yes Historical Provider, MD   omeprazole (PRILOSEC) 40 MG delayed release capsule Take 40 mg by mouth 2 times daily   Yes Historical Provider, MD   gabapentin (NEURONTIN) 800 MG tablet Take 800 mg by mouth 3 times daily. .   Yes Historical Provider, MD   traZODone (DESYREL) 50 MG tablet Take  mg by mouth nightly as needed for Sleep   Yes Historical Provider, MD   ondansetron (ZOFRAN-ODT) 4 MG disintegrating tablet Take 4 mg by mouth every 8 hours as needed for Nausea or Vomiting   Yes Historical Provider, MD   metFORMIN (GLUCOPHAGE) 500 MG tablet Take 500 mg by mouth 3 times daily   Yes Historical Provider, MD        Lab data:  CBC:    Recent Labs      09/30/18   0543  10/01/18   0623  10/02/18   0633   WBC  9.6  10.0  10.1   HGB  10.0*  10.0*  9.6*   PLT  183  177  172     CMP:    Recent Labs      09/30/18   0543  10/01/18   0623  10/02/18   0633   NA  130*  130*  127*   K  3.7  3.6  4.2   CL  94*  94*  92*   CO2  23  23  23   BUN  29*  24*  22   CREATININE  0.9  0.8  0.8   GLUCOSE  170*  180*  217*   CALCIUM  8.6  8.9  9.0     Urine:  Recent Labs      10/01/18   0845   COLORU  LIGHT ORANGE      Sed Rate: No results for input(s): SEDRATE in the last 72 hours. Radiology       Review of Systems:  Source of data: patient    CONSTITUTIONAL  Fever: none, Chills: none, Weight loss: yes, Malaise: yes, Fatigue: yes, Diaphoresis: none, Weakness: none    SKIN  Rash: none, Itch: none, Open sores: none    HENT:  Headache: none, Hearing loss: none, Tinnitus: none, Ear pain: none, Ear discharge: none,   Nasal bleeding: none, Congestion: none, Stridor: none, Sore throat: none. EYES  Blurred vision: none, Double vision: none, Photophobia: none, Eye pain: none, Eye discharge: none, Eye redness: none. CARDIOVASCULAR  Chest pain: none, Arm pain: none, Palpitations: none, Orthopnea: none, Claudication: none,  Leg swelling: none, PND: none.      RESPIRATORY  Cough: none,

## 2018-10-02 NOTE — PLAN OF CARE
Problem: Nutrition  Goal: Optimal nutrition therapy  Outcome: Ongoing  Nutrition Problem: Severe malnutrition, in context of acute illness or injury  Intervention: Food and/or Nutrient Delivery: Continue current diet (Changed ONS.)  Nutritional Goals: Pt will eat at least 75% of her meals during her LOS to help improve her nutritional status

## 2018-10-02 NOTE — PROGRESS NOTES
Nutrition Assessment    Type and Reason for Visit: Reassess    Nutrition Recommendations:   Continue current diet and ONS. Consider multivitamin and an appetite stimulant. If no improvement in oral intake, recommend consider enteral nutrition for nutrition support. Malnutrition Assessment:  · Malnutrition Status: Meets the criteria for severe malnutrition  · Context: Acute illness or injury  · Findings of the 6 clinical characteristics of malnutrition (Minimum of 2 out of 6 clinical characteristics is required to make the diagnosis of moderate or severe Protein Calorie Malnutrition based on AND/ASPEN Guidelines):  1. Energy Intake-Less than or equal to 50%, greater than or equal to 5 days    2. Fat Loss-Moderate subcutaneous fat loss, Orbital, Triceps (Did physical assessment on triceps; visual assessment on orbital)  3. Muscle Loss-Moderate muscle mass loss, Temples (temporalis muscle), Clavicles (pectoralis and deltoids), Thigh (quadriceps), Calf (gastrocnemius) (Did physical assessment pt's clavicles; visual assessment of temples, thighs and calf)    Nutrition Diagnosis:   · Problem: Severe malnutrition, in context of acute illness or injury  · Etiology: related to Nausea     Signs and symptoms:  as evidenced by Diet history of poor intake, Moderate muscle loss, Moderate loss of subcutaneous fat    Nutrition Assessment:  · Subjective Assessment: Pt seen, upon entering the room, pt tearful, \"I just don't feel good\". Note she declined PT and OT today. Denies any nausea or abdominal pain, some fullness. Appetite remains poor, she voiced she knows she needs to eat and get good protein sources in. Dislikes magic cup, wants ONS changed back to glucerna TID.  s/p paracentesis 10/1/18~ 5.3 liters removed. Sodium 127, Glucose 217, Albumin 2, Ammonia 47. Medications: zofran, vancocin, humalog, protonix, lactulose, IV albumin, sodium chloride tablets. Note possible ECF at discharge, plan IV antibiotics.

## 2018-10-03 VITALS
HEART RATE: 87 BPM | OXYGEN SATURATION: 97 % | TEMPERATURE: 98.1 F | SYSTOLIC BLOOD PRESSURE: 107 MMHG | BODY MASS INDEX: 27.4 KG/M2 | DIASTOLIC BLOOD PRESSURE: 55 MMHG | RESPIRATION RATE: 16 BRPM | WEIGHT: 160.5 LBS | HEIGHT: 64 IN

## 2018-10-03 PROBLEM — R18.8 CIRRHOSIS OF LIVER WITH ASCITES (HCC): Status: ACTIVE | Noted: 2018-10-03

## 2018-10-03 PROBLEM — D86.9 SARCOIDOSIS: Status: ACTIVE | Noted: 2018-10-03

## 2018-10-03 PROBLEM — K74.60 CIRRHOSIS OF LIVER WITH ASCITES (HCC): Status: ACTIVE | Noted: 2018-10-03

## 2018-10-03 LAB
ALBUMIN SERPL-MCNC: 2.4 G/DL (ref 3.5–5.1)
ALP BLD-CCNC: 220 U/L (ref 38–126)
ALT SERPL-CCNC: 7 U/L (ref 11–66)
ANION GAP SERPL CALCULATED.3IONS-SCNC: 10 MEQ/L (ref 8–16)
AST SERPL-CCNC: 10 U/L (ref 5–40)
BILIRUB SERPL-MCNC: 1.1 MG/DL (ref 0.3–1.2)
BUN BLDV-MCNC: 20 MG/DL (ref 7–22)
CALCIUM SERPL-MCNC: 9.2 MG/DL (ref 8.5–10.5)
CHLORIDE BLD-SCNC: 93 MEQ/L (ref 98–111)
CO2: 23 MEQ/L (ref 23–33)
CREAT SERPL-MCNC: 0.9 MG/DL (ref 0.4–1.2)
GFR SERPL CREATININE-BSD FRML MDRD: 65 ML/MIN/1.73M2
GLUCOSE BLD-MCNC: 198 MG/DL (ref 70–108)
GLUCOSE BLD-MCNC: 206 MG/DL (ref 70–108)
GLUCOSE BLD-MCNC: 207 MG/DL (ref 70–108)
GLUCOSE BLD-MCNC: 212 MG/DL (ref 70–108)
POTASSIUM SERPL-SCNC: 3.7 MEQ/L (ref 3.5–5.2)
SODIUM BLD-SCNC: 126 MEQ/L (ref 135–145)
TOTAL PROTEIN: 5.2 G/DL (ref 6.1–8)
VANCOMYCIN TROUGH: 11.4 UG/ML (ref 5–15)

## 2018-10-03 PROCEDURE — 6370000000 HC RX 637 (ALT 250 FOR IP): Performed by: INTERNAL MEDICINE

## 2018-10-03 PROCEDURE — P9047 ALBUMIN (HUMAN), 25%, 50ML: HCPCS | Performed by: INTERNAL MEDICINE

## 2018-10-03 PROCEDURE — 6360000002 HC RX W HCPCS: Performed by: INTERNAL MEDICINE

## 2018-10-03 PROCEDURE — 6370000000 HC RX 637 (ALT 250 FOR IP): Performed by: NURSE PRACTITIONER

## 2018-10-03 PROCEDURE — 80053 COMPREHEN METABOLIC PANEL: CPT

## 2018-10-03 PROCEDURE — 99232 SBSQ HOSP IP/OBS MODERATE 35: CPT | Performed by: INTERNAL MEDICINE

## 2018-10-03 PROCEDURE — 36415 COLL VENOUS BLD VENIPUNCTURE: CPT

## 2018-10-03 PROCEDURE — 80202 ASSAY OF VANCOMYCIN: CPT

## 2018-10-03 PROCEDURE — 82948 REAGENT STRIP/BLOOD GLUCOSE: CPT

## 2018-10-03 PROCEDURE — 2580000003 HC RX 258: Performed by: INTERNAL MEDICINE

## 2018-10-03 RX ORDER — FERROUS SULFATE 325(65) MG
325 TABLET ORAL 2 TIMES DAILY WITH MEALS
Qty: 30 TABLET | Refills: 3 | Status: ON HOLD | OUTPATIENT
Start: 2018-10-03 | End: 2018-12-07

## 2018-10-03 RX ORDER — LACTULOSE 10 G/15ML
20 SOLUTION ORAL 2 TIMES DAILY
Qty: 1800 ML | Refills: 1 | Status: SHIPPED | OUTPATIENT
Start: 2018-10-03 | End: 2018-10-03

## 2018-10-03 RX ORDER — FERROUS SULFATE 325(65) MG
325 TABLET ORAL 2 TIMES DAILY WITH MEALS
Status: DISCONTINUED | OUTPATIENT
Start: 2018-10-03 | End: 2018-10-04 | Stop reason: HOSPADM

## 2018-10-03 RX ORDER — LACTULOSE 10 G/15ML
20 SOLUTION ORAL 2 TIMES DAILY
Qty: 1800 ML | Refills: 1 | Status: ON HOLD | OUTPATIENT
Start: 2018-10-03 | End: 2018-12-07

## 2018-10-03 RX ORDER — SODIUM CHLORIDE 1000 MG
1 TABLET, SOLUBLE MISCELLANEOUS
Qty: 90 TABLET | Refills: 3 | Status: SHIPPED | OUTPATIENT
Start: 2018-10-03

## 2018-10-03 RX ADMIN — SODIUM CHLORIDE TAB 1 GM 1 G: 1 TAB at 17:22

## 2018-10-03 RX ADMIN — ONDANSETRON 4 MG: 4 TABLET, ORALLY DISINTEGRATING ORAL at 04:08

## 2018-10-03 RX ADMIN — Medication 2 UNITS: at 11:25

## 2018-10-03 RX ADMIN — ALBUMIN (HUMAN) 12.5 G: 0.25 INJECTION, SOLUTION INTRAVENOUS at 00:57

## 2018-10-03 RX ADMIN — RIFAXIMIN 550 MG: 550 TABLET ORAL at 08:37

## 2018-10-03 RX ADMIN — ALBUMIN (HUMAN) 12.5 G: 0.25 INJECTION, SOLUTION INTRAVENOUS at 06:40

## 2018-10-03 RX ADMIN — Medication 1 UNITS: at 07:36

## 2018-10-03 RX ADMIN — DOCUSATE SODIUM 100 MG: 100 CAPSULE, LIQUID FILLED ORAL at 08:38

## 2018-10-03 RX ADMIN — OXYCODONE HYDROCHLORIDE 5 MG: 5 TABLET ORAL at 09:43

## 2018-10-03 RX ADMIN — Medication 10 ML: at 08:38

## 2018-10-03 RX ADMIN — OXYCODONE HYDROCHLORIDE 5 MG: 5 TABLET ORAL at 02:59

## 2018-10-03 RX ADMIN — Medication 10 ML: at 01:01

## 2018-10-03 RX ADMIN — Medication 2 UNITS: at 17:20

## 2018-10-03 RX ADMIN — PANTOPRAZOLE SODIUM 40 MG: 40 TABLET, DELAYED RELEASE ORAL at 06:40

## 2018-10-03 RX ADMIN — SODIUM CHLORIDE TAB 1 GM 1 G: 1 TAB at 11:25

## 2018-10-03 RX ADMIN — FERROUS SULFATE TAB 325 MG (65 MG ELEMENTAL FE) 325 MG: 325 (65 FE) TAB at 17:22

## 2018-10-03 RX ADMIN — SODIUM CHLORIDE TAB 1 GM 1 G: 1 TAB at 08:37

## 2018-10-03 RX ADMIN — OXYCODONE HYDROCHLORIDE 5 MG: 5 TABLET ORAL at 17:22

## 2018-10-03 ASSESSMENT — PAIN SCALES - GENERAL
PAINLEVEL_OUTOF10: 7
PAINLEVEL_OUTOF10: 7
PAINLEVEL_OUTOF10: 8
PAINLEVEL_OUTOF10: 6

## 2018-10-03 NOTE — PLAN OF CARE
Problem: Pain:  Goal: Pain level will decrease  Pain level will decrease   Outcome: Ongoing  Pt reported pain 7 on scale 0-10. Pt request oxycodone when appropriate. Pain level decreases to 5. Will continue to monitor and manage pain appropriately. Problem: Falls - Risk of:  Goal: Will remain free from falls  Will remain free from falls   Outcome: Ongoing  Pt free from falls this shift. Nonskid socks available, bed alarm on, 2/4 side rails up, call light in reach, bed in locked and lowest position, clear pathway. Will continue to monitor. Goal: Absence of physical injury  Absence of physical injury   Outcome: Ongoing  Pt free from physical injury this shift. Nonskid socks available, bed alarm on, 2/4 side rails up, call light in reach, bed in locked and lowest position, clear pathway. Will continue to monitor. Problem: Nutrition  Goal: Optimal nutrition therapy  Outcome: Ongoing  Pt noted she ate half of evening meal. Will continue to monitor and encourage nutritional therapy. Problem: Discharge Planning:  Goal: Discharged to appropriate level of care  Discharged to appropriate level of care   Outcome: Ongoing  Pt plans to be discharged to home with family. Will continue to monitor for further discharge needs. Problem: Daily Care:  Goal: Daily care needs are met  Daily care needs are met   Outcome: Ongoing  Daily care needs are met through purposeful hourly rounding. Will continue to monitor. Problem: Musculor/Skeletal Functional Status  Goal: Highest potential functional level  Outcome: Ongoing  Pt able to pivot from bed to bedside commode on own. Pt able to turn self. Will continue to encourage activity. Problem: DISCHARGE BARRIERS  Goal: Patient's continuum of care needs are met  Outcome: Ongoing  Pt plans to be discharged to home with care of family. Will continue to monitor. Comments: Care plan reviewed with patient.   Patient verbalizes understanding of the plan of care and contribute to

## 2018-10-03 NOTE — PROGRESS NOTES
INTERNAL MEDICINE Progress Note  10/3/2018 12:23 PM  Subjective:   Admit Date: 9/25/2018  PCP: Alcides Abrams MD  Interval History:   C/o edema left leg, doppler left leg negative for DVT  Improved abd pain,   no nausea, no vomiting, no fever, no chills    Objective:   Vitals: BP (!) 108/51   Pulse 85   Temp 98.1 °F (36.7 °C) (Oral)   Resp 16   Ht 5' 4\" (1.626 m)   Wt 160 lb 8 oz (72.8 kg)   SpO2 97%   BMI 27.55 kg/m²   General appearance: alert and cooperative with exam  HEENT:  atraumatic  Neck:  no JVD  Lungs: diminished breath sounds bibasilar  Heart: S1, S2 normal  Abdomen: distended, mildly tender abd diffusely, no masses  Extremities: left leg edema,   Neurologic: Alert, oriented, thought content appropriate      Medications:   Scheduled Meds:   ferrous sulfate  325 mg Oral BID WC    rifaximin  550 mg Oral BID    lactulose  20 g Oral BID    sodium chloride flush  10 mL Intravenous 2 times per day    sodium chloride  1 g Oral TID WC    vancomycin  1,000 mg Intravenous Q24H    pantoprazole  40 mg Oral QAM AC    vancomycin (VANCOCIN) intermittent dosing (placeholder)   Other RX Placeholder    insulin lispro  0-6 Units Subcutaneous TID WC    insulin lispro  0-3 Units Subcutaneous Nightly    docusate sodium  100 mg Oral BID    heparin (porcine)  5,000 Units Subcutaneous Q8H     Continuous Infusions:   dextrose         Lab Results:   CBC:   Recent Labs      10/01/18   0623  10/02/18   0633   WBC  10.0  10.1   HGB  10.0*  9.6*   PLT  177  172     BMP:    Recent Labs      10/01/18   0623  10/02/18   0633  10/03/18   0613   NA  130*  127*  126*   K  3.6  4.2  3.7   CL  94*  92*  93*   CO2  23  23  23   BUN  24*  22  20   CREATININE  0.8  0.8  0.9   GLUCOSE  180*  217*  206*     Hepatic:   Recent Labs      10/01/18   0623  10/02/18   0633  10/03/18   0613   AST  11  10  10   ALT  9*  8*  7*   BILITOT  1.1  1.1  1.1   ALKPHOS  271*  257*  220*     AFP-Tumor Marker 1.3  <8.4 ug/L   INR:   Recent Labs

## 2018-10-03 NOTE — PROGRESS NOTES
daily  3. Lactulose 20 gm BID. Script provided. Goal is 2 to 3 Bm's a day. 4. Xifaxan 550 mg BID added for HE 10-2-18 . Script provided  5. Antiemetics; Zofran  6. Nephrology following. Do not want her on sodium restricted diet at this time as she has a total body sodium deficit. 7. ID following, on vancomycin. 8. Repeat paracentesis 10-1-18. SAAG 1.5.   improved from 467. Follow culture and cytology. 9. Once treatment for SBP is completed pt will need to be on prophylaxis for SBP such as bactrim DS once daily. I did discuss this with Dr Kymberly Ashby and will follow his recommendations once initial antibiotic course is completed. 10. Will stop IV venofer until infection has resolved. Oral iron BID can be used in mean time. 11. Pt is normally on weekly paracentesis as outpatient. She will need scheduled for a paracentesis Friday 10-5-18 as outpatient if discharged with labs and limit to 5 liters. Albumin 25 gm IV 25% also ordered with this. She is distended with ascites and does not think she can make it a whole week before her next scheduled paracentesis. 12. Okay form discharge form GI standpoint.   When discharged she will need a follow up with Dr Bj Alejandro or Kezia Alvarez CNP in one week.           Assessment and plan discussed with Dr. Leatha Lee, RUBEN, CNP, 10/3/2018, 9:03 AM  .

## 2018-10-03 NOTE — PROGRESS NOTES
Pharmacy Vancomycin Consult     Vancomycin Day: 8  Current Dosin mg IV every 24 hours    Temp max:  afebrile    Recent Labs      10/02/18   0633  10/03/18   0613   BUN  22  20       Recent Labs      10/02/18   0633  10/03/18   0613   CREATININE  0.8  0.9       Recent Labs      10/01/18   0623  10/02/18   0633   WBC  10.0  10.1         Intake/Output Summary (Last 24 hours) at 10/03/18 1436  Last data filed at 10/03/18 0409   Gross per 24 hour   Intake 1028.52 ml   Output 0 ml   Net 1028.52 ml     Culture Date Source Results   10/1/2018 Paracentesis fluid No growth   2018 Blood x 2 No growth   2018 Urine No growth       Ht Readings from Last 1 Encounters:   18 5' 4\" (1.626 m)        Wt Readings from Last 1 Encounters:   10/03/18 160 lb 8 oz (72.8 kg)         Body mass index is 27.55 kg/m². Estimated Creatinine Clearance: 70 mL/min (based on SCr of 0.9 mg/dL). Trough: 11.4    Assessment/Plan:   Increase dose of vancomycin to 1250 mg IV every 24 hours   I>O, but not all urinary output is being recorded. Serum creatinine is stable.  No follow-up levels ordered at this time.     Zari Philip, PharmD, BCPS  10/3/2018  2:40 PM

## 2018-10-03 NOTE — FLOWSHEET NOTE
[address] [address]  PHYSICAL THERAPY MISSED TREATMENT NOTE  ACUTE CARE  [facility] RENAL TELEMETRY 6K       Pt irritated with attempt at mobility of bed therex. WIth any type of encouragement pt became more and more defensive/irritated. Pt stating \" I dont know where all this commotion came from\" about her capabilities with activity. PTA tried to explain that her mobility has been very limited since hospitalization and that she has been ill also. Pt continued to become more and more irritated with encouragement/explinations and she continued to refuse despite reasoning. Nursing was informed and aware.         Missed Treatment  [address] [id number]

## 2018-10-03 NOTE — PROGRESS NOTES
edema  Neurologic: Mental status: Alert, oriented, thought content appropriate  PSY: No evidence of depression. Mood is normal for the patient. Skin: Warm and dry. No unusual lesions or rashes noted. Muscles: Hand  is equal and strong bilaterally. Leg strength is equal and strong. Skeletal: No bony or skeletal abnormalities noted. Admission weight: 143 lb (64.9 kg)  Wt Readings from Last 3 Encounters:   10/03/18 160 lb 8 oz (72.8 kg)     Body mass index is 27.55 kg/m². Clinical Impressions:    1. Acute kidney injury from dehydration from diarrhea and poor oral intake. 2. Chronic kidney disease  3. Hyponatremia from poor intake of sodium and partitioning of sodium into ascitic fluid and loss of sodium in diarrhea. 4. Metabolic acidosis from loss of bicarbonate in diarrhea and failure or her kidneys to generate bicarbonate due to acute kidney injury. 5. Ascites requiring weekly paracentesis   6. MRSA in ascites fluid  7. Sarcoidosis  8. Spontaneous bacterial peritonitis. 9. TOTAL BODY SODIUM DEFICIT  10. anemia     Plan of Care      1. Do not sodium restrict this patient at this time. She has total body sodium deficit. 2. She is iron deficient. Will give iron by IV. 3. Will continue the salt tablets. Joyce Garcia, DO  [facility].

## 2018-10-03 NOTE — CARE COORDINATION
10/3/18 8:48 AM     Spoke with Tameka Sinha again, she and her daughter have both now agreed that her care can be managed at home with HH/HI. She does not wish to continue therapy or have aide services, only nursing care for IV ATB. Provider lists given to patient, she elects to use [facility]. Referrals made, [facility] can accept and patient has 100% coverage. [facility] is unable to service [facility]. List again provided to patient, she selects [facility]. Referral called, info faxed, await notification of acceptance. 11:19 AM   Have attempted to fax referral to [facility] 3 times without success. Patient states she does not care who referral goes to, [facility] is next on list, patient okayed. Referral called, they are able to accept, will need to fax AVS and orders once obtained.

## 2018-10-04 NOTE — PROGRESS NOTES
Patient discharged with family escorted to main lobby door in wheelchair. Patient denies any questions.

## 2018-10-06 LAB
ANAEROBIC CULTURE: NORMAL
BODY FLUID CULTURE, STERILE: NORMAL
GRAM STAIN RESULT: NORMAL

## 2018-10-09 ENCOUNTER — HOSPITAL ENCOUNTER (OUTPATIENT)
Dept: NURSING | Age: 53
Discharge: HOME | End: 2018-10-09
Payer: MEDICARE

## 2018-10-09 ENCOUNTER — HOSPITAL ENCOUNTER (OUTPATIENT)
Dept: ULTRASOUND IMAGING | Age: 53
Discharge: HOME | End: 2018-10-09
Payer: MEDICARE

## 2018-10-09 VITALS
RESPIRATION RATE: 18 BRPM | SYSTOLIC BLOOD PRESSURE: 111 MMHG | HEART RATE: 76 BPM | OXYGEN SATURATION: 97 % | TEMPERATURE: 97.3 F | DIASTOLIC BLOOD PRESSURE: 60 MMHG

## 2018-10-09 DIAGNOSIS — R18.8 OTHER ASCITES: ICD-10-CM

## 2018-10-09 LAB
ALBUMIN FLUID: 0.5 GM/DL
ALBUMIN SERPL-MCNC: 2.4 G/DL (ref 3.5–5.1)
BODY FLUID RBC: 6000 /CUMM
CHARACTER, BODY FLUID: NORMAL
COLOR: NORMAL
MESOTHELIAL CELLS BODY FLUID: NORMAL
MONONUCLEAR CELLS BODY FLUID: 88.4 %
PATHOLOGIST REVIEW: NORMAL
POLYMORPHONUCLEAR CELLS BODY FLUID: 11.6 %
PROTEIN FLUID: 1.1 GM/DL
SPECIMEN: NORMAL
TOTAL NUCLEATED CELLS BODY FLUID: 150 /CUMM (ref 0–500)
TOTAL VOLUME RECEIVED BODY FLUID: 80 ML

## 2018-10-09 PROCEDURE — 82042 OTHER SOURCE ALBUMIN QUAN EA: CPT

## 2018-10-09 PROCEDURE — 87070 CULTURE OTHR SPECIMN AEROBIC: CPT

## 2018-10-09 PROCEDURE — 87205 SMEAR GRAM STAIN: CPT

## 2018-10-09 PROCEDURE — 2709999900 HC NON-CHARGEABLE SUPPLY

## 2018-10-09 PROCEDURE — P9046 ALBUMIN (HUMAN), 25%, 20 ML: HCPCS | Performed by: INTERNAL MEDICINE

## 2018-10-09 PROCEDURE — 36415 COLL VENOUS BLD VENIPUNCTURE: CPT

## 2018-10-09 PROCEDURE — 87075 CULTR BACTERIA EXCEPT BLOOD: CPT

## 2018-10-09 PROCEDURE — 6360000002 HC RX W HCPCS: Performed by: INTERNAL MEDICINE

## 2018-10-09 PROCEDURE — 89050 BODY FLUID CELL COUNT: CPT

## 2018-10-09 PROCEDURE — 82040 ASSAY OF SERUM ALBUMIN: CPT

## 2018-10-09 PROCEDURE — 49083 ABD PARACENTESIS W/IMAGING: CPT

## 2018-10-09 PROCEDURE — 2580000003 HC RX 258: Performed by: INTERNAL MEDICINE

## 2018-10-09 PROCEDURE — 84157 ASSAY OF PROTEIN OTHER: CPT

## 2018-10-09 PROCEDURE — 96365 THER/PROPH/DIAG IV INF INIT: CPT

## 2018-10-09 RX ORDER — ALBUMIN (HUMAN) 12.5 G/50ML
25 SOLUTION INTRAVENOUS ONCE
Status: COMPLETED | OUTPATIENT
Start: 2018-10-09 | End: 2018-10-09

## 2018-10-09 RX ORDER — SODIUM CHLORIDE 0.9 % (FLUSH) 0.9 %
10 SYRINGE (ML) INJECTION PRN
Status: DISCONTINUED | OUTPATIENT
Start: 2018-10-09 | End: 2018-10-10 | Stop reason: HOSPADM

## 2018-10-09 RX ADMIN — ALBUMIN (HUMAN) 25 G: 0.25 INJECTION, SOLUTION INTRAVENOUS at 09:28

## 2018-10-09 RX ADMIN — Medication 10 ML: at 10:28

## 2018-10-09 ASSESSMENT — PAIN DESCRIPTION - DESCRIPTORS: DESCRIPTORS: SORE

## 2018-10-09 ASSESSMENT — PAIN - FUNCTIONAL ASSESSMENT: PAIN_FUNCTIONAL_ASSESSMENT: 0-10

## 2018-10-09 NOTE — PROGRESS NOTES
0839 Patient arrived to OPN via wheelchair for albumin infusion. Son is waiting in lobby. Patient has mid line will use for albumin infusion  PT RIGHTS AND RESPONSIBILITIES OFFERED TO PT.  7704 Patient resting quietly on cart denies any needs. Discharge instructions reviewed with patient verbalized understanding. 1025 Infusion completed mid line flushed. Patient denies any needs. 1045 Patient taken to ultrasound via wheelchair for albumin infusion.     _m___ Safety:       (Environmental)   Cory to environment   Ensure ID band is correct and in place/ allergy band as needed   Assess for fall risk   Initiate fall precautions as applicable (fall band, side rails, etc.)   Call light within reach   Bed in low position/ wheels locked    _m___ Pain:        Assess pain level and characteristics   Administer analgesics as ordered   Assess effectiveness of pain management and report to MD as needed    _m___ Knowledge Deficit:   Assess baseline knowledge   Provide teaching at level of understanding   Provide teaching via preferred learning method   Evaluate teaching effectiveness    __m__ Hemodynamic/Respiratory Status:       (Pre and Post Procedure Monitoring)   Assess/Monitor vital signs and LOC   Assess Baseline SpO2 prior to any sedation   Obtain weight/height   Assess vital signs/ LOC until patient meets discharge criteria   Monitor procedure site and notify MD of any issues    _

## 2018-10-14 LAB
ANAEROBIC CULTURE: NORMAL
BODY FLUID CULTURE, STERILE: NORMAL
GRAM STAIN RESULT: NORMAL

## 2018-10-16 ENCOUNTER — HOSPITAL ENCOUNTER (OUTPATIENT)
Dept: NURSING | Age: 53
Discharge: HOME | End: 2018-10-16
Payer: COMMERCIAL

## 2018-10-16 ENCOUNTER — HOSPITAL ENCOUNTER (OUTPATIENT)
Dept: ULTRASOUND IMAGING | Age: 53
Discharge: HOME | End: 2018-10-16
Payer: COMMERCIAL

## 2018-10-16 VITALS
HEART RATE: 85 BPM | DIASTOLIC BLOOD PRESSURE: 52 MMHG | RESPIRATION RATE: 18 BRPM | SYSTOLIC BLOOD PRESSURE: 95 MMHG | TEMPERATURE: 98.2 F

## 2018-10-16 DIAGNOSIS — R18.8 OTHER ASCITES: ICD-10-CM

## 2018-10-16 LAB
ALBUMIN FLUID: 0.6 GM/DL
ALBUMIN SERPL-MCNC: 2.4 G/DL (ref 3.5–5.1)
BODY FLUID RBC: 5000 /CUMM
CHARACTER, BODY FLUID: NORMAL
COLOR: NORMAL
MONONUCLEAR CELLS BODY FLUID: 91.9 %
PATHOLOGIST REVIEW: NORMAL
POLYMORPHONUCLEAR CELLS BODY FLUID: 8.1 %
PROTEIN FLUID: 1.1 GM/DL
SPECIMEN: NORMAL
TOTAL NUCLEATED CELLS BODY FLUID: 125 /CUMM (ref 0–500)
TOTAL VOLUME RECEIVED BODY FLUID: 80 ML

## 2018-10-16 PROCEDURE — 87205 SMEAR GRAM STAIN: CPT

## 2018-10-16 PROCEDURE — 96365 THER/PROPH/DIAG IV INF INIT: CPT

## 2018-10-16 PROCEDURE — 2580000003 HC RX 258: Performed by: INTERNAL MEDICINE

## 2018-10-16 PROCEDURE — 36415 COLL VENOUS BLD VENIPUNCTURE: CPT

## 2018-10-16 PROCEDURE — 89050 BODY FLUID CELL COUNT: CPT

## 2018-10-16 PROCEDURE — 2709999900 HC NON-CHARGEABLE SUPPLY

## 2018-10-16 PROCEDURE — 82042 OTHER SOURCE ALBUMIN QUAN EA: CPT

## 2018-10-16 PROCEDURE — 49083 ABD PARACENTESIS W/IMAGING: CPT

## 2018-10-16 PROCEDURE — P9046 ALBUMIN (HUMAN), 25%, 20 ML: HCPCS | Performed by: INTERNAL MEDICINE

## 2018-10-16 PROCEDURE — 82040 ASSAY OF SERUM ALBUMIN: CPT

## 2018-10-16 PROCEDURE — 87075 CULTR BACTERIA EXCEPT BLOOD: CPT

## 2018-10-16 PROCEDURE — 87070 CULTURE OTHR SPECIMN AEROBIC: CPT

## 2018-10-16 PROCEDURE — 6360000002 HC RX W HCPCS: Performed by: INTERNAL MEDICINE

## 2018-10-16 PROCEDURE — 84157 ASSAY OF PROTEIN OTHER: CPT

## 2018-10-16 RX ORDER — SODIUM CHLORIDE 0.9 % (FLUSH) 0.9 %
10 SYRINGE (ML) INJECTION PRN
Status: DISCONTINUED | OUTPATIENT
Start: 2018-10-16 | End: 2018-10-17 | Stop reason: HOSPADM

## 2018-10-16 RX ORDER — ALBUMIN (HUMAN) 12.5 G/50ML
25 SOLUTION INTRAVENOUS ONCE
Status: COMPLETED | OUTPATIENT
Start: 2018-10-16 | End: 2018-10-16

## 2018-10-16 RX ADMIN — Medication 10 ML: at 09:27

## 2018-10-16 RX ADMIN — ALBUMIN (HUMAN) 25 G: 0.25 INJECTION, SOLUTION INTRAVENOUS at 09:27

## 2018-10-16 RX ADMIN — Medication 10 ML: at 10:26

## 2018-10-16 ASSESSMENT — PAIN SCALES - GENERAL: PAINLEVEL_OUTOF10: 7

## 2018-10-16 ASSESSMENT — PAIN DESCRIPTION - PAIN TYPE: TYPE: CHRONIC PAIN

## 2018-10-16 ASSESSMENT — PAIN DESCRIPTION - LOCATION: LOCATION: ABDOMEN

## 2018-10-16 NOTE — PROGRESS NOTES
0915 pt arrives to [facility] for albumin and albumin lab draw pre paracentesis. All questions and concerns addressed. Son at bedside]  [facility] PT TO READ.  4479 lab at bedside  5207 right upper arm midline intact.  Pt states home health changes the dressing, dated 10/15  0930 __m__ Safety:       (Environmental)  Marialuisa Specter to environment   Ensure ID band is correct and in place/ allergy band as needed   Assess for fall risk   Initiate fall precautions as applicable (fall band, side rails, etc.)   Call light within reach   Bed in low position/ wheels locked    __m__ Pain:        Assess pain level and characteristics   Administer analgesics as ordered   Assess effectiveness of pain management and report to MD as needed    __m__ Knowledge Deficit:   Assess baseline knowledge   Provide teaching at level of understanding   Provide teaching via preferred learning method   Evaluate teaching effectiveness    _m___ Hemodynamic/Respiratory Status:       (Pre and Post Procedure Monitoring)   Assess/Monitor vital signs and LOC   Assess Baseline SpO2 prior to any sedation   Obtain weight/height   Assess vital signs/ LOC until patient meets discharge criteria   Monitor procedure site and notify MD of any issues    __m_ Infection-Risk of Central Venous Catheter:   Monitor for infection signs and symptoms (catheter site redness, temperature elevation, etc)   Assess for infection risks   Educate regarding infection prevention   Manage central venous catheter (flushes/ dressing changes per protocol)    0940 WRITTEN DISCHARGE INSTRUCTIONS GIVEN TO PT-VERBALIZES UNDERSTANDING  1030 ultrasound notified of pt infusion complete  1043 to ultrasound via wheelchair, per transport

## 2018-10-21 LAB
ANAEROBIC CULTURE: NORMAL
BODY FLUID CULTURE, STERILE: NORMAL
GRAM STAIN RESULT: NORMAL

## 2018-10-23 ENCOUNTER — HOSPITAL ENCOUNTER (OUTPATIENT)
Dept: NURSING | Age: 53
Discharge: HOME | End: 2018-10-23
Payer: COMMERCIAL

## 2018-10-23 ENCOUNTER — HOSPITAL ENCOUNTER (OUTPATIENT)
Dept: ULTRASOUND IMAGING | Age: 53
Discharge: HOME | End: 2018-10-23
Payer: COMMERCIAL

## 2018-10-23 VITALS
TEMPERATURE: 96.6 F | SYSTOLIC BLOOD PRESSURE: 120 MMHG | RESPIRATION RATE: 18 BRPM | OXYGEN SATURATION: 99 % | DIASTOLIC BLOOD PRESSURE: 58 MMHG | HEART RATE: 88 BPM

## 2018-10-23 DIAGNOSIS — K74.60 CIRRHOSIS OF LIVER WITH ASCITES, UNSPECIFIED HEPATIC CIRRHOSIS TYPE (HCC): ICD-10-CM

## 2018-10-23 DIAGNOSIS — R18.8 CIRRHOSIS OF LIVER WITH ASCITES, UNSPECIFIED HEPATIC CIRRHOSIS TYPE (HCC): ICD-10-CM

## 2018-10-23 LAB
ALBUMIN FLUID: 0.6 GM/DL
ALBUMIN SERPL-MCNC: 2.2 G/DL (ref 3.5–5.1)
BODY FLUID RBC: 4000 /CUMM
CHARACTER, BODY FLUID: NORMAL
COLOR: NORMAL
MONONUCLEAR CELLS BODY FLUID: 86.5 %
PATHOLOGIST REVIEW: NORMAL
POLYMORPHONUCLEAR CELLS BODY FLUID: 13.5 %
PROTEIN FLUID: 1 GM/DL
SPECIMEN: NORMAL
TOTAL NUCLEATED CELLS BODY FLUID: 125 /CUMM (ref 0–500)
TOTAL VOLUME RECEIVED BODY FLUID: 80 ML

## 2018-10-23 PROCEDURE — P9046 ALBUMIN (HUMAN), 25%, 20 ML: HCPCS | Performed by: INTERNAL MEDICINE

## 2018-10-23 PROCEDURE — 36415 COLL VENOUS BLD VENIPUNCTURE: CPT

## 2018-10-23 PROCEDURE — 89050 BODY FLUID CELL COUNT: CPT

## 2018-10-23 PROCEDURE — 6360000002 HC RX W HCPCS: Performed by: INTERNAL MEDICINE

## 2018-10-23 PROCEDURE — 87205 SMEAR GRAM STAIN: CPT

## 2018-10-23 PROCEDURE — 84157 ASSAY OF PROTEIN OTHER: CPT

## 2018-10-23 PROCEDURE — 82042 OTHER SOURCE ALBUMIN QUAN EA: CPT

## 2018-10-23 PROCEDURE — 2709999900 HC NON-CHARGEABLE SUPPLY

## 2018-10-23 PROCEDURE — 49083 ABD PARACENTESIS W/IMAGING: CPT

## 2018-10-23 PROCEDURE — 87070 CULTURE OTHR SPECIMN AEROBIC: CPT

## 2018-10-23 PROCEDURE — 87075 CULTR BACTERIA EXCEPT BLOOD: CPT

## 2018-10-23 PROCEDURE — 96365 THER/PROPH/DIAG IV INF INIT: CPT

## 2018-10-23 PROCEDURE — 82040 ASSAY OF SERUM ALBUMIN: CPT

## 2018-10-23 RX ORDER — ALBUMIN (HUMAN) 12.5 G/50ML
25 SOLUTION INTRAVENOUS ONCE
Status: COMPLETED | OUTPATIENT
Start: 2018-10-23 | End: 2018-10-23

## 2018-10-23 RX ADMIN — ALBUMIN (HUMAN) 25 G: 0.25 INJECTION, SOLUTION INTRAVENOUS at 09:45

## 2018-10-23 ASSESSMENT — PAIN DESCRIPTION - DESCRIPTORS: DESCRIPTORS: ACHING

## 2018-10-23 ASSESSMENT — PAIN - FUNCTIONAL ASSESSMENT: PAIN_FUNCTIONAL_ASSESSMENT: 0-10

## 2018-10-23 NOTE — PROGRESS NOTES
2802 Patient received in OPN for albumin infusion with son in law at side  PT [address] PT.  0915 Attempted to start IV times 3 unsuccessful. 4536 IV started in hand lab collected and sent   0919 Albumin infusion started  1000 US called and instructed that patient would be completed by [id number] Discharge instructions given to patient verbalized understanding. Albumin completed patient tolerated well.   1052 Patient taken to ultrasound via wheelchair for paracentesis    __m__ Safety:       (Environmental)  [place] to environment   Ensure ID band is correct and in place/ allergy band as needed   Assess for fall risk   Initiate fall precautions as applicable (fall band, side rails, etc.)   Call light within reach   Bed in low position/ wheels locked    _m___ Pain:        Assess pain level and characteristics   Administer analgesics as ordered   Assess effectiveness of pain management and report to MD as needed    _m___ Knowledge Deficit:   Assess baseline knowledge   Provide teaching at level of understanding   Provide teaching via preferred learning method   Evaluate teaching effectiveness    m____ Hemodynamic/Respiratory Status:       (Pre and Post Procedure Monitoring)   Assess/Monitor vital signs and LOC   Assess Baseline SpO2 prior to any sedation   Obtain weight/height   Assess vital signs/ LOC until patient meets discharge criteria   Monitor procedure site and notify MD of any issues    _

## 2018-10-28 LAB
ANAEROBIC CULTURE: NORMAL
BODY FLUID CULTURE, STERILE: NORMAL
GRAM STAIN RESULT: NORMAL

## 2018-10-30 ENCOUNTER — HOSPITAL ENCOUNTER (OUTPATIENT)
Dept: NURSING | Age: 53
Discharge: HOME | End: 2018-10-30
Payer: COMMERCIAL

## 2018-10-30 ENCOUNTER — HOSPITAL ENCOUNTER (OUTPATIENT)
Dept: ULTRASOUND IMAGING | Age: 53
Discharge: HOME | End: 2018-10-30
Payer: COMMERCIAL

## 2018-10-30 VITALS
OXYGEN SATURATION: 100 % | TEMPERATURE: 97.3 F | SYSTOLIC BLOOD PRESSURE: 104 MMHG | HEART RATE: 91 BPM | DIASTOLIC BLOOD PRESSURE: 54 MMHG | RESPIRATION RATE: 16 BRPM

## 2018-10-30 DIAGNOSIS — K74.60 CIRRHOSIS OF LIVER WITH ASCITES, UNSPECIFIED HEPATIC CIRRHOSIS TYPE (HCC): ICD-10-CM

## 2018-10-30 DIAGNOSIS — R18.8 CIRRHOSIS OF LIVER WITH ASCITES, UNSPECIFIED HEPATIC CIRRHOSIS TYPE (HCC): ICD-10-CM

## 2018-10-30 LAB
ALBUMIN FLUID: 0.5 GM/DL
ALBUMIN SERPL-MCNC: 2.1 G/DL (ref 3.5–5.1)
BODY FLUID RBC: 4000 /CUMM
CHARACTER, BODY FLUID: NORMAL
COLOR: NORMAL
MONONUCLEAR CELLS BODY FLUID: 82.6 %
PATHOLOGIST REVIEW: NORMAL
POLYMORPHONUCLEAR CELLS BODY FLUID: 17.4 %
PROTEIN FLUID: 1 GM/DL
SPECIMEN: NORMAL
TOTAL NUCLEATED CELLS BODY FLUID: 123 /CUMM (ref 0–500)
TOTAL VOLUME RECEIVED BODY FLUID: 60 ML

## 2018-10-30 PROCEDURE — 2709999900 HC NON-CHARGEABLE SUPPLY

## 2018-10-30 PROCEDURE — 82040 ASSAY OF SERUM ALBUMIN: CPT

## 2018-10-30 PROCEDURE — 87205 SMEAR GRAM STAIN: CPT

## 2018-10-30 PROCEDURE — 89050 BODY FLUID CELL COUNT: CPT

## 2018-10-30 PROCEDURE — 36415 COLL VENOUS BLD VENIPUNCTURE: CPT

## 2018-10-30 PROCEDURE — P9046 ALBUMIN (HUMAN), 25%, 20 ML: HCPCS | Performed by: INTERNAL MEDICINE

## 2018-10-30 PROCEDURE — 87070 CULTURE OTHR SPECIMN AEROBIC: CPT

## 2018-10-30 PROCEDURE — 84157 ASSAY OF PROTEIN OTHER: CPT

## 2018-10-30 PROCEDURE — 6360000002 HC RX W HCPCS: Performed by: INTERNAL MEDICINE

## 2018-10-30 PROCEDURE — 82042 OTHER SOURCE ALBUMIN QUAN EA: CPT

## 2018-10-30 PROCEDURE — 96365 THER/PROPH/DIAG IV INF INIT: CPT

## 2018-10-30 PROCEDURE — 87075 CULTR BACTERIA EXCEPT BLOOD: CPT

## 2018-10-30 PROCEDURE — 49083 ABD PARACENTESIS W/IMAGING: CPT

## 2018-10-30 RX ORDER — ALBUMIN (HUMAN) 12.5 G/50ML
25 SOLUTION INTRAVENOUS ONCE
Status: COMPLETED | OUTPATIENT
Start: 2018-10-30 | End: 2018-10-30

## 2018-10-30 RX ADMIN — ALBUMIN (HUMAN) 25 G: 0.25 INJECTION, SOLUTION INTRAVENOUS at 09:25

## 2018-10-30 ASSESSMENT — PAIN - FUNCTIONAL ASSESSMENT: PAIN_FUNCTIONAL_ASSESSMENT: 0-10

## 2018-10-30 ASSESSMENT — PAIN DESCRIPTION - DESCRIPTORS: DESCRIPTORS: CRAMPING;ACHING

## 2018-10-30 NOTE — PROGRESS NOTES
1551:  Arrives via  wc  for iv albumin. Process reviewed and PT RIGHTS AND RESPONSIBILITIES OFFERED TO PT.    0915:  ATTEMPTED TO START IV X4.  NO SUCCESS. IV TEAM BEEPED.   0950:  RESTING WITHOUT COMPLAINTS.   1030:  1416 Walter Obrien:  TO US PER WC FOR PARACENTESIS.    _M___ Safety:       (Environmental)   [place] to environment   Ensure ID band is correct and in place/ allergy band as needed   Assess for fall risk   Initiate fall precautions as applicable (fall band, side rails, etc.)   Call light within reach   Bed in low position/ wheels locked    _M___ Pain:        Assess pain level and characteristics   Administer analgesics as ordered   Assess effectiveness of pain management and report to MD as needed    _M___ Knowledge Deficit:   Assess baseline knowledge   Provide teaching at level of understanding   Provide teaching via preferred learning method   Evaluate teaching effectiveness    __M__ Hemodynamic/Respiratory Status:       (Pre and Post Procedure Monitoring)   Assess/Monitor vital signs and LOC   Assess Baseline SpO2 prior to any sedation   Obtain weight/height   Assess vital signs/ LOC until patient meets discharge criteria   Monitor procedure site and notify MD of any issues

## 2018-11-04 LAB
ANAEROBIC CULTURE: NORMAL
BODY FLUID CULTURE, STERILE: NORMAL
GRAM STAIN RESULT: NORMAL

## 2018-11-06 ENCOUNTER — HOSPITAL ENCOUNTER (OUTPATIENT)
Dept: NURSING | Age: 53
End: 2018-11-06

## 2018-12-03 ENCOUNTER — APPOINTMENT (OUTPATIENT)
Dept: ULTRASOUND IMAGING | Age: 53
DRG: 432 | End: 2018-12-03
Payer: COMMERCIAL

## 2018-12-03 ENCOUNTER — HOSPITAL ENCOUNTER (INPATIENT)
Age: 53
LOS: 4 days | Discharge: HOME HEALTH | DRG: 432 | End: 2018-12-07
Attending: INTERNAL MEDICINE | Admitting: INTERNAL MEDICINE
Payer: COMMERCIAL

## 2018-12-03 ENCOUNTER — APPOINTMENT (OUTPATIENT)
Dept: GENERAL RADIOLOGY | Age: 53
DRG: 432 | End: 2018-12-03
Payer: COMMERCIAL

## 2018-12-03 DIAGNOSIS — D50.0 ANEMIA DUE TO GI BLOOD LOSS: ICD-10-CM

## 2018-12-03 DIAGNOSIS — R18.8 ASCITES OF LIVER: ICD-10-CM

## 2018-12-03 DIAGNOSIS — E11.42 TYPE 2 DIABETES MELLITUS WITH DIABETIC POLYNEUROPATHY, WITHOUT LONG-TERM CURRENT USE OF INSULIN (HCC): ICD-10-CM

## 2018-12-03 DIAGNOSIS — K92.0 HEMATEMESIS WITH NAUSEA: Primary | ICD-10-CM

## 2018-12-03 PROBLEM — K92.2 ACUTE UPPER GI BLEEDING: Status: ACTIVE | Noted: 2018-12-03

## 2018-12-03 PROBLEM — D62 ACUTE BLOOD LOSS ANEMIA: Status: ACTIVE | Noted: 2018-12-03

## 2018-12-03 LAB
ABO: NORMAL
ALBUMIN FLUID: 0.7 GM/DL
ALBUMIN SERPL-MCNC: 2.2 G/DL (ref 3.5–5.1)
ALP BLD-CCNC: 210 U/L (ref 38–126)
ALT SERPL-CCNC: < 5 U/L (ref 11–66)
AMMONIA: 54 UMOL/L (ref 11–60)
AMYLASE FLUID: 7 U/L
ANION GAP SERPL CALCULATED.3IONS-SCNC: 13 MEQ/L (ref 8–16)
ANISOCYTOSIS: PRESENT
ANTIBODY SCREEN: NORMAL
AST SERPL-CCNC: 7 U/L (ref 5–40)
BASOPHILS # BLD: 0.5 %
BASOPHILS ABSOLUTE: 0 THOU/MM3 (ref 0–0.1)
BILIRUB SERPL-MCNC: 1.5 MG/DL (ref 0.3–1.2)
BUN BLDV-MCNC: 22 MG/DL (ref 7–22)
CALCIUM SERPL-MCNC: 8.2 MG/DL (ref 8.5–10.5)
CHLORIDE BLD-SCNC: 106 MEQ/L (ref 98–111)
CO2: 18 MEQ/L (ref 23–33)
CREAT SERPL-MCNC: 0.9 MG/DL (ref 0.4–1.2)
EKG ATRIAL RATE: 116 BPM
EKG P AXIS: 52 DEGREES
EKG P-R INTERVAL: 130 MS
EKG Q-T INTERVAL: 346 MS
EKG QRS DURATION: 74 MS
EKG QTC CALCULATION (BAZETT): 480 MS
EKG R AXIS: 12 DEGREES
EKG T AXIS: 47 DEGREES
EKG VENTRICULAR RATE: 116 BPM
EOSINOPHIL # BLD: 0.6 %
EOSINOPHILS ABSOLUTE: 0 THOU/MM3 (ref 0–0.4)
ERYTHROCYTE [DISTWIDTH] IN BLOOD BY AUTOMATED COUNT: 21.6 % (ref 11.5–14.5)
ERYTHROCYTE [DISTWIDTH] IN BLOOD BY AUTOMATED COUNT: 84.6 FL (ref 35–45)
GFR SERPL CREATININE-BSD FRML MDRD: 65 ML/MIN/1.73M2
GLUCOSE BLD-MCNC: 176 MG/DL (ref 70–108)
HCT VFR BLD CALC: 19.4 % (ref 37–47)
HCT VFR BLD CALC: 20.5 % (ref 37–47)
HEMOCCULT STL QL: NEGATIVE
HEMOGLOBIN: 5.6 GM/DL (ref 12–16)
HEMOGLOBIN: 6.3 GM/DL (ref 12–16)
HYPOCHROMIA: PRESENT
IMMATURE GRANS (ABS): 0.04 THOU/MM3 (ref 0–0.07)
IMMATURE GRANULOCYTES: 0.5 %
INR BLD: 1.37 (ref 0.85–1.13)
LACTIC ACID: 4 MMOL/L (ref 0.5–2.2)
LIPASE: 31.7 U/L (ref 5.6–51.3)
LYMPHOCYTES # BLD: 15.2 %
LYMPHOCYTES ABSOLUTE: 1.2 THOU/MM3 (ref 1–4.8)
MCH RBC QN AUTO: 31.1 PG (ref 26–33)
MCHC RBC AUTO-ENTMCNC: 28.9 GM/DL (ref 32.2–35.5)
MCV RBC AUTO: 107.8 FL (ref 81–99)
MONOCYTES # BLD: 7.3 %
MONOCYTES ABSOLUTE: 0.6 THOU/MM3 (ref 0.4–1.3)
MRSA SCREEN RT-PCR: NEGATIVE
NUCLEATED RED BLOOD CELLS: 0 /100 WBC
OSMOLALITY CALCULATION: 281.5 MOSMOL/KG (ref 275–300)
PLATELET # BLD: 208 THOU/MM3 (ref 130–400)
PMV BLD AUTO: 10.5 FL (ref 9.4–12.4)
POTASSIUM SERPL-SCNC: 4.2 MEQ/L (ref 3.5–5.2)
RBC # BLD: 1.8 MILL/MM3 (ref 4.2–5.4)
RH FACTOR: NORMAL
SEG NEUTROPHILS: 75.9 %
SEGMENTED NEUTROPHILS ABSOLUTE COUNT: 6 THOU/MM3 (ref 1.8–7.7)
SODIUM BLD-SCNC: 137 MEQ/L (ref 135–145)
TOTAL PROTEIN: 5.7 G/DL (ref 6.1–8)
VANCOMYCIN RESISTANT ENTEROCOCCUS: NEGATIVE
WBC # BLD: 7.9 THOU/MM3 (ref 4.8–10.8)

## 2018-12-03 PROCEDURE — 93010 ELECTROCARDIOGRAM REPORT: CPT | Performed by: NUCLEAR MEDICINE

## 2018-12-03 PROCEDURE — 87641 MR-STAPH DNA AMP PROBE: CPT

## 2018-12-03 PROCEDURE — C9113 INJ PANTOPRAZOLE SODIUM, VIA: HCPCS | Performed by: PHYSICIAN ASSISTANT

## 2018-12-03 PROCEDURE — 0W9G3ZZ DRAINAGE OF PERITONEAL CAVITY, PERCUTANEOUS APPROACH: ICD-10-PCS | Performed by: INTERNAL MEDICINE

## 2018-12-03 PROCEDURE — 87070 CULTURE OTHR SPECIMN AEROBIC: CPT

## 2018-12-03 PROCEDURE — 6370000000 HC RX 637 (ALT 250 FOR IP): Performed by: NURSE PRACTITIONER

## 2018-12-03 PROCEDURE — 85610 PROTHROMBIN TIME: CPT

## 2018-12-03 PROCEDURE — 93005 ELECTROCARDIOGRAM TRACING: CPT | Performed by: PHYSICIAN ASSISTANT

## 2018-12-03 PROCEDURE — APPSS180 APP SPLIT SHARED TIME > 60 MINUTES: Performed by: NURSE PRACTITIONER

## 2018-12-03 PROCEDURE — 2580000003 HC RX 258: Performed by: NURSE PRACTITIONER

## 2018-12-03 PROCEDURE — 36430 TRANSFUSION BLD/BLD COMPNT: CPT

## 2018-12-03 PROCEDURE — C1729 CATH, DRAINAGE: HCPCS

## 2018-12-03 PROCEDURE — 89050 BODY FLUID CELL COUNT: CPT

## 2018-12-03 PROCEDURE — 99222 1ST HOSP IP/OBS MODERATE 55: CPT | Performed by: INTERNAL MEDICINE

## 2018-12-03 PROCEDURE — 96374 THER/PROPH/DIAG INJ IV PUSH: CPT

## 2018-12-03 PROCEDURE — C1751 CATH, INF, PER/CENT/MIDLINE: HCPCS

## 2018-12-03 PROCEDURE — 36556 INSERT NON-TUNNEL CV CATH: CPT

## 2018-12-03 PROCEDURE — 86901 BLOOD TYPING SEROLOGIC RH(D): CPT

## 2018-12-03 PROCEDURE — 6360000002 HC RX W HCPCS: Performed by: PHYSICIAN ASSISTANT

## 2018-12-03 PROCEDURE — 86900 BLOOD TYPING SEROLOGIC ABO: CPT

## 2018-12-03 PROCEDURE — 88112 CYTOPATH CELL ENHANCE TECH: CPT

## 2018-12-03 PROCEDURE — 82272 OCCULT BLD FECES 1-3 TESTS: CPT

## 2018-12-03 PROCEDURE — 85014 HEMATOCRIT: CPT

## 2018-12-03 PROCEDURE — 49083 ABD PARACENTESIS W/IMAGING: CPT | Performed by: INTERNAL MEDICINE

## 2018-12-03 PROCEDURE — 36592 COLLECT BLOOD FROM PICC: CPT

## 2018-12-03 PROCEDURE — 82042 OTHER SOURCE ALBUMIN QUAN EA: CPT

## 2018-12-03 PROCEDURE — 2709999900 HC NON-CHARGEABLE SUPPLY

## 2018-12-03 PROCEDURE — 99285 EMERGENCY DEPT VISIT HI MDM: CPT

## 2018-12-03 PROCEDURE — 87075 CULTR BACTERIA EXCEPT BLOOD: CPT

## 2018-12-03 PROCEDURE — 71045 X-RAY EXAM CHEST 1 VIEW: CPT

## 2018-12-03 PROCEDURE — 82150 ASSAY OF AMYLASE: CPT

## 2018-12-03 PROCEDURE — 88305 TISSUE EXAM BY PATHOLOGIST: CPT

## 2018-12-03 PROCEDURE — 6370000000 HC RX 637 (ALT 250 FOR IP): Performed by: INTERNAL MEDICINE

## 2018-12-03 PROCEDURE — P9016 RBC LEUKOCYTES REDUCED: HCPCS

## 2018-12-03 PROCEDURE — 6360000002 HC RX W HCPCS: Performed by: NURSE PRACTITIONER

## 2018-12-03 PROCEDURE — 36415 COLL VENOUS BLD VENIPUNCTURE: CPT

## 2018-12-03 PROCEDURE — 2000000000 HC ICU R&B

## 2018-12-03 PROCEDURE — 82140 ASSAY OF AMMONIA: CPT

## 2018-12-03 PROCEDURE — 83690 ASSAY OF LIPASE: CPT

## 2018-12-03 PROCEDURE — 05HM33Z INSERTION OF INFUSION DEVICE INTO RIGHT INTERNAL JUGULAR VEIN, PERCUTANEOUS APPROACH: ICD-10-PCS | Performed by: INTERNAL MEDICINE

## 2018-12-03 PROCEDURE — 49082 ABD PARACENTESIS: CPT

## 2018-12-03 PROCEDURE — 2580000003 HC RX 258: Performed by: PHYSICIAN ASSISTANT

## 2018-12-03 PROCEDURE — P9047 ALBUMIN (HUMAN), 25%, 50ML: HCPCS | Performed by: NURSE PRACTITIONER

## 2018-12-03 PROCEDURE — 6360000002 HC RX W HCPCS: Performed by: INTERNAL MEDICINE

## 2018-12-03 PROCEDURE — 87081 CULTURE SCREEN ONLY: CPT

## 2018-12-03 PROCEDURE — 83605 ASSAY OF LACTIC ACID: CPT

## 2018-12-03 PROCEDURE — 96375 TX/PRO/DX INJ NEW DRUG ADDON: CPT

## 2018-12-03 PROCEDURE — 49083 ABD PARACENTESIS W/IMAGING: CPT

## 2018-12-03 PROCEDURE — 87205 SMEAR GRAM STAIN: CPT

## 2018-12-03 PROCEDURE — 80053 COMPREHEN METABOLIC PANEL: CPT

## 2018-12-03 PROCEDURE — 85018 HEMOGLOBIN: CPT

## 2018-12-03 PROCEDURE — 85025 COMPLETE CBC W/AUTO DIFF WBC: CPT

## 2018-12-03 PROCEDURE — 86923 COMPATIBILITY TEST ELECTRIC: CPT

## 2018-12-03 PROCEDURE — 86850 RBC ANTIBODY SCREEN: CPT

## 2018-12-03 PROCEDURE — 2580000003 HC RX 258: Performed by: INTERNAL MEDICINE

## 2018-12-03 PROCEDURE — 87500 VANOMYCIN DNA AMP PROBE: CPT

## 2018-12-03 RX ORDER — DEXAMETHASONE 0.5 MG/1
0.5 TABLET ORAL EVERY 12 HOURS SCHEDULED
Status: DISCONTINUED | OUTPATIENT
Start: 2018-12-03 | End: 2018-12-07 | Stop reason: HOSPADM

## 2018-12-03 RX ORDER — ACETAMINOPHEN 325 MG/1
650 TABLET ORAL EVERY 4 HOURS PRN
Status: DISCONTINUED | OUTPATIENT
Start: 2018-12-03 | End: 2018-12-07 | Stop reason: HOSPADM

## 2018-12-03 RX ORDER — NICOTINE POLACRILEX 4 MG
15 LOZENGE BUCCAL PRN
Status: DISCONTINUED | OUTPATIENT
Start: 2018-12-03 | End: 2018-12-07 | Stop reason: HOSPADM

## 2018-12-03 RX ORDER — ALBUMIN (HUMAN) 12.5 G/50ML
12.5 SOLUTION INTRAVENOUS
Status: COMPLETED | OUTPATIENT
Start: 2018-12-03 | End: 2018-12-03

## 2018-12-03 RX ORDER — ONDANSETRON 2 MG/ML
4 INJECTION INTRAMUSCULAR; INTRAVENOUS EVERY 6 HOURS PRN
Status: DISCONTINUED | OUTPATIENT
Start: 2018-12-03 | End: 2018-12-07 | Stop reason: HOSPADM

## 2018-12-03 RX ORDER — URSODIOL 300 MG/1
250 CAPSULE ORAL 2 TIMES DAILY
Status: ON HOLD | COMMUNITY
End: 2018-12-07

## 2018-12-03 RX ORDER — ALBUMIN (HUMAN) 12.5 G/50ML
25 SOLUTION INTRAVENOUS ONCE
Status: DISCONTINUED | OUTPATIENT
Start: 2018-12-03 | End: 2018-12-03

## 2018-12-03 RX ORDER — OXYCODONE HYDROCHLORIDE 5 MG/1
5 TABLET ORAL EVERY 6 HOURS PRN
Status: DISCONTINUED | OUTPATIENT
Start: 2018-12-03 | End: 2018-12-07 | Stop reason: HOSPADM

## 2018-12-03 RX ORDER — FENTANYL CITRATE 50 UG/ML
25 INJECTION, SOLUTION INTRAMUSCULAR; INTRAVENOUS ONCE
Status: COMPLETED | OUTPATIENT
Start: 2018-12-03 | End: 2018-12-03

## 2018-12-03 RX ORDER — ONDANSETRON 2 MG/ML
4 INJECTION INTRAMUSCULAR; INTRAVENOUS ONCE
Status: COMPLETED | OUTPATIENT
Start: 2018-12-03 | End: 2018-12-03

## 2018-12-03 RX ORDER — SODIUM CHLORIDE 0.9 % (FLUSH) 0.9 %
10 SYRINGE (ML) INJECTION PRN
Status: DISCONTINUED | OUTPATIENT
Start: 2018-12-03 | End: 2018-12-07 | Stop reason: HOSPADM

## 2018-12-03 RX ORDER — DEXTROSE AND SODIUM CHLORIDE 5; .9 G/100ML; G/100ML
INJECTION, SOLUTION INTRAVENOUS CONTINUOUS
Status: DISCONTINUED | OUTPATIENT
Start: 2018-12-03 | End: 2018-12-03

## 2018-12-03 RX ORDER — FENTANYL CITRATE 50 UG/ML
25 INJECTION, SOLUTION INTRAMUSCULAR; INTRAVENOUS EVERY 4 HOURS PRN
Status: DISCONTINUED | OUTPATIENT
Start: 2018-12-03 | End: 2018-12-07 | Stop reason: HOSPADM

## 2018-12-03 RX ORDER — DEXTROSE MONOHYDRATE 25 G/50ML
12.5 INJECTION, SOLUTION INTRAVENOUS PRN
Status: DISCONTINUED | OUTPATIENT
Start: 2018-12-03 | End: 2018-12-07 | Stop reason: HOSPADM

## 2018-12-03 RX ORDER — DEXTROSE MONOHYDRATE 50 MG/ML
100 INJECTION, SOLUTION INTRAVENOUS PRN
Status: DISCONTINUED | OUTPATIENT
Start: 2018-12-03 | End: 2018-12-07 | Stop reason: HOSPADM

## 2018-12-03 RX ORDER — SODIUM CHLORIDE 0.9 % (FLUSH) 0.9 %
10 SYRINGE (ML) INJECTION EVERY 12 HOURS SCHEDULED
Status: DISCONTINUED | OUTPATIENT
Start: 2018-12-03 | End: 2018-12-07 | Stop reason: HOSPADM

## 2018-12-03 RX ORDER — 0.9 % SODIUM CHLORIDE 0.9 %
250 INTRAVENOUS SOLUTION INTRAVENOUS ONCE
Status: COMPLETED | OUTPATIENT
Start: 2018-12-03 | End: 2018-12-04

## 2018-12-03 RX ORDER — 0.9 % SODIUM CHLORIDE 0.9 %
1000 INTRAVENOUS SOLUTION INTRAVENOUS ONCE
Status: COMPLETED | OUTPATIENT
Start: 2018-12-03 | End: 2018-12-03

## 2018-12-03 RX ADMIN — SODIUM CHLORIDE 250 ML: 9 INJECTION, SOLUTION INTRAVENOUS at 21:27

## 2018-12-03 RX ADMIN — OXYCODONE HYDROCHLORIDE 5 MG: 5 TABLET ORAL at 22:55

## 2018-12-03 RX ADMIN — OXYCODONE HYDROCHLORIDE 5 MG: 5 TABLET ORAL at 16:58

## 2018-12-03 RX ADMIN — ONDANSETRON 4 MG: 2 INJECTION INTRAMUSCULAR; INTRAVENOUS at 13:28

## 2018-12-03 RX ADMIN — ALBUMIN (HUMAN) 12.5 G: 0.25 INJECTION, SOLUTION INTRAVENOUS at 23:54

## 2018-12-03 RX ADMIN — SODIUM CHLORIDE 1000 ML: 9 INJECTION, SOLUTION INTRAVENOUS at 11:24

## 2018-12-03 RX ADMIN — SODIUM CHLORIDE 8 MG/HR: 9 INJECTION, SOLUTION INTRAVENOUS at 13:11

## 2018-12-03 RX ADMIN — ALBUMIN (HUMAN) 12.5 G: 0.25 INJECTION, SOLUTION INTRAVENOUS at 23:01

## 2018-12-03 RX ADMIN — FENTANYL CITRATE 25 MCG: 50 INJECTION, SOLUTION INTRAMUSCULAR; INTRAVENOUS at 11:24

## 2018-12-03 RX ADMIN — DEXTROSE AND SODIUM CHLORIDE: 5; 900 INJECTION, SOLUTION INTRAVENOUS at 16:35

## 2018-12-03 RX ADMIN — ONDANSETRON 4 MG: 2 INJECTION INTRAMUSCULAR; INTRAVENOUS at 11:24

## 2018-12-03 RX ADMIN — SODIUM CHLORIDE 80 MG: 9 INJECTION, SOLUTION INTRAVENOUS at 11:24

## 2018-12-03 RX ADMIN — FENTANYL CITRATE 25 MCG: 50 INJECTION, SOLUTION INTRAMUSCULAR; INTRAVENOUS at 13:28

## 2018-12-03 RX ADMIN — SODIUM CHLORIDE 8 MG/HR: 9 INJECTION, SOLUTION INTRAVENOUS at 20:17

## 2018-12-03 RX ADMIN — CEFTRIAXONE SODIUM 1 G: 1 INJECTION, POWDER, FOR SOLUTION INTRAMUSCULAR; INTRAVENOUS at 20:16

## 2018-12-03 RX ADMIN — SODIUM CHLORIDE 250 ML: 9 INJECTION, SOLUTION INTRAVENOUS at 14:15

## 2018-12-03 RX ADMIN — RIFAXIMIN 550 MG: 550 TABLET ORAL at 20:16

## 2018-12-03 RX ADMIN — DEXAMETHASONE 0.5 MG: 0.5 TABLET ORAL at 20:16

## 2018-12-03 RX ADMIN — ONDANSETRON 4 MG: 2 INJECTION INTRAMUSCULAR; INTRAVENOUS at 22:55

## 2018-12-03 ASSESSMENT — PAIN SCALES - GENERAL
PAINLEVEL_OUTOF10: 7
PAINLEVEL_OUTOF10: 3
PAINLEVEL_OUTOF10: 9
PAINLEVEL_OUTOF10: 10
PAINLEVEL_OUTOF10: 8

## 2018-12-03 ASSESSMENT — ENCOUNTER SYMPTOMS
NAUSEA: 0
SORE THROAT: 0
ABDOMINAL PAIN: 1
WHEEZING: 0
BLOOD IN STOOL: 0
RHINORRHEA: 0
SHORTNESS OF BREATH: 0
DIARRHEA: 1
BACK PAIN: 0
VOMITING: 1
COUGH: 0
COLOR CHANGE: 0

## 2018-12-03 ASSESSMENT — PAIN DESCRIPTION - LOCATION
LOCATION: ABDOMEN
LOCATION: NECK
LOCATION: ABDOMEN

## 2018-12-03 ASSESSMENT — PAIN DESCRIPTION - PAIN TYPE
TYPE: ACUTE PAIN

## 2018-12-03 NOTE — FLOWSHEET NOTE
Consult from RADHA, [facility]- patient in distress from pain    When  entered ICU room, Madison was crying from the pain she was in. Nurse was present in room . Madison shared that she moved to [facility] in April from [place] due to her health. She lives now with her daughter, son-in-law and their four children, who are her only support. She considers herself Lizzy In[place], but states she has not been to Quaker in a long time and has no Quaker home in [facility].  asked what thoughts bring her peace, she responded she loves the ocean- especially [address] [place] [address].  has been there, and they discussed the area and calming thoughts of sand and ocean waves as well as breathing to cope with pain. Madison became tearful, asking why she is in such poor health at such a young age and why she can't have a good, pain-free day once in a while. Discussed idea of redemptive suffering, mentioning that she is \"in\" the 22nd psalm and working towards the comfort of the 23rd. She gave indications that she is afraid she is dying Brittney Antigo won't tell me how long I have to live\"), and mentioned that she does hope to one day see her sister in heaven. She inquired if Orthodox is needed more than once and what might happen to those who are not baptized.  offered several songs to help distract/calm patient, as well as prayer for her intentions. Patient asked   to return;  explained [facility] at [facility] and stated she would try to return tomorrow. \  Continued support needed. 12/03/18 1708   Encounter Summary   Services provided to: Patient   Referral/Consult From: Nurse   Support System Children;Family members   Continue Visiting Yes  (12/3)   Complexity of Encounter High   Length of Encounter 1 hour   Crisis   Type Emotional distress   Assessment Approachable;Tearful; Hopeless; Helplessness   Intervention Active listening;Explored feelings, thoughts, concerns;Explored coping resources;Nurtured

## 2018-12-03 NOTE — CONSULTS
[] Central Venous Line (Specify Site):  [] Urinary Catheter  [] Arterial Line (Specify Site)  [] Peripheral IV access  [] Other:     ICU PROPHYLAXIS/THERAPY:   Stress ulcer:  [x] PPI Agent  [] H2RA [] Sucralfate [] Other:     DVT prophylaxis: [] Lovenox                                 [x] SCDs                                 [] SQ Heparin                                 [] Encourage ambulation           [] Already on Anticoagulation  Medications:      Infusion Medications    pantoprozole (PROTONIX) infusion 8 mg/hr (12/03/18 1311)    dextrose 5 % and 0.9 % NaCl 125 mL/hr at 12/03/18 1635     Scheduled Medications    sodium chloride  250 mL Intravenous Once    rifaximin  550 mg Oral BID    sodium chloride flush  10 mL Intravenous 2 times per day     PRN Meds: sodium chloride flush, acetaminophen, ondansetron, oxyCODONE, fentanNYL      Intake/Output Summary (Last 24 hours) at 12/03/18 9479  Last data filed at 12/03/18 1605   Gross per 24 hour   Intake              340 ml   Output                0 ml   Net              340 ml       Diet:  Diet NPO Effective Now Exceptions are: Ice Chips    ROS:  Constitutional: ROS: negative for - chills or fever  Head: no headache, no head injury, no migraine   Eyes ROS: denies blurred/double vision  Ears ROS: no hearing difficulty, no tinnitus  Mouth and Throat ROS: no ulceration, dysphagia, dental caries  Psychological ROS: no depression, no anxiety, no panic attacks, denies suicide/homicide ideation  Endocrine ROS: denies polyuria, polydypsia, no heat or cold intolerance  Respiratory ROS: no cough, shortness of breath, or wheezing  Cardiovascular ROS: no chest pain or dyspnea on exertion  Gastrointestinal ROS: positive for - abdominal pain and hematemesis  Genito-Urinary ROS: denies dysuria, frequency, urgency; denies hematuria  Musculoskeletal ROS: positive for - swelling in bilateral leg  Neurological ROS: no syncope, no seizures, no numbness or tingling of hands, no 09/26/2018    GLUCOSEU NEGATIVE 09/26/2018       Radiology:  XR CHEST PORTABLE   Final Result   1. Poor inflation lungs. Normal heart size. A right jugular line has been inserted with its tip in right atrium. No pneumothorax is seen. 2. Mild bibasilar atelectasis/pneumonia. No effusion is seen. **This report has been created using voice recognition software. It may contain minor errors which are inherent in voice recognition technology. **      Final report electronically signed by Dr. Elvia Arreola on 12/3/2018 1:33 PM           Resuscitation/\"Code\" Status   [x] Full resuscitation [] DNR-Comfort Care-Arrest  [] DNR-Comfort Care   [] Limited Resuscitation   [] No ET intubation   [] No CPR   [] No shock for non-perfusing rhythm   [] No post-arrest resuscitative medications   [] Other:     Tele:   [x] yes ST              [] no    Active Hospital Problems    Diagnosis Date Noted    Acute upper GI bleeding [K92.2] 12/03/2018       Electronically signed by RUBEN Baker CNP on 12/3/2018 at 5:49 PM   Case disccussed with Dr. Cynthia Buenrostro. Ultrasound showed a large amount of peritoneal fluid. There were some septations. Patient states she has a history of sarcoidosis. She has an allergy to prednisone. Initiate Decadron. On Rocephin. Electronically signed by Rebecca Felton MD.

## 2018-12-03 NOTE — FLOWSHEET NOTE
1500-[facility] contacted for consult.  Office staff informed me that they refuse to see the patient as she has been discharged from their service

## 2018-12-03 NOTE — ED NOTES
Patient drapped for IJ placement. Patient continues moving around during procedure and screaming and tightening neck. Patient instructed several times to hold still and keep hands away from sterile field. Multiple attempted to place IJ on left side of neck unsuccessful due to patient moving frequently. IJ placed on right side by Dr. Santos Montes. Patient screaming saying she needs pain medication because her neck is sore. Explained to patient with continued movement during procedure, she may bruise.       Zahida Palomino RN  12/03/18 8914

## 2018-12-03 NOTE — ED NOTES
Call ICU and spoke with [facility]. Explained to Kiesha Marley that patients IJ is placed and patient will be coming to floor shortly once placement of IJ is confirmed.  Orders obtained from [facility] for pain and nausea medication for patient post procedure     Ethel Day RN  12/03/18 3125

## 2018-12-04 PROBLEM — E43 SEVERE MALNUTRITION (HCC): Status: ACTIVE | Noted: 2018-12-04

## 2018-12-04 LAB
ALBUMIN SERPL-MCNC: 2.4 G/DL (ref 3.5–5.1)
ALP BLD-CCNC: 145 U/L (ref 38–126)
ALT SERPL-CCNC: 6 U/L (ref 11–66)
ANION GAP SERPL CALCULATED.3IONS-SCNC: 10 MEQ/L (ref 8–16)
AST SERPL-CCNC: 9 U/L (ref 5–40)
BILIRUB SERPL-MCNC: 1.6 MG/DL (ref 0.3–1.2)
BILIRUBIN DIRECT: 0.9 MG/DL (ref 0–0.3)
BODY FLUID RBC: < 2000 /CUMM
BUN BLDV-MCNC: 25 MG/DL (ref 7–22)
CALCIUM SERPL-MCNC: 8.1 MG/DL (ref 8.5–10.5)
CHARACTER, BODY FLUID: NORMAL
CHLORIDE BLD-SCNC: 108 MEQ/L (ref 98–111)
CO2: 22 MEQ/L (ref 23–33)
COLOR: YELLOW
CREAT SERPL-MCNC: 0.6 MG/DL (ref 0.4–1.2)
ERYTHROCYTE [DISTWIDTH] IN BLOOD BY AUTOMATED COUNT: 19.4 % (ref 11.5–14.5)
ERYTHROCYTE [DISTWIDTH] IN BLOOD BY AUTOMATED COUNT: 63.8 FL (ref 35–45)
GFR SERPL CREATININE-BSD FRML MDRD: > 90 ML/MIN/1.73M2
GLUCOSE BLD-MCNC: 106 MG/DL (ref 70–108)
GLUCOSE BLD-MCNC: 116 MG/DL (ref 70–108)
GLUCOSE BLD-MCNC: 119 MG/DL (ref 70–108)
HCT VFR BLD CALC: 23.6 % (ref 37–47)
HCT VFR BLD CALC: 24.4 % (ref 37–47)
HCT VFR BLD CALC: 25.5 % (ref 37–47)
HCT VFR BLD CALC: 26.1 % (ref 37–47)
HEMOGLOBIN: 7.7 GM/DL (ref 12–16)
HEMOGLOBIN: 8 GM/DL (ref 12–16)
HEMOGLOBIN: 8.2 GM/DL (ref 12–16)
HEMOGLOBIN: 8.3 GM/DL (ref 12–16)
INR BLD: 1.29 (ref 0.85–1.13)
MCH RBC QN AUTO: 31.3 PG (ref 26–33)
MCHC RBC AUTO-ENTMCNC: 32.8 GM/DL (ref 32.2–35.5)
MCV RBC AUTO: 95.3 FL (ref 81–99)
MONONUCLEAR CELLS BODY FLUID: 87.3 %
PATHOLOGIST REVIEW: NORMAL
PLATELET # BLD: 182 THOU/MM3 (ref 130–400)
PMV BLD AUTO: 9.6 FL (ref 9.4–12.4)
POLYMORPHONUCLEAR CELLS BODY FLUID: 12.7 %
POTASSIUM REFLEX MAGNESIUM: 3.7 MEQ/L (ref 3.5–5.2)
RBC # BLD: 2.56 MILL/MM3 (ref 4.2–5.4)
SODIUM BLD-SCNC: 140 MEQ/L (ref 135–145)
SPECIMEN: NORMAL
TOTAL NUCLEATED CELLS BODY FLUID: 91 /CUMM (ref 0–500)
TOTAL PROTEIN: 5.1 G/DL (ref 6.1–8)
TOTAL VOLUME RECEIVED BODY FLUID: 24 ML
WBC # BLD: 6.8 THOU/MM3 (ref 4.8–10.8)

## 2018-12-04 PROCEDURE — 6360000002 HC RX W HCPCS: Performed by: INTERNAL MEDICINE

## 2018-12-04 PROCEDURE — P9017 PLASMA 1 DONOR FRZ W/IN 8 HR: HCPCS

## 2018-12-04 PROCEDURE — 2000000000 HC ICU R&B

## 2018-12-04 PROCEDURE — APPSS180 APP SPLIT SHARED TIME > 60 MINUTES: Performed by: NURSE PRACTITIONER

## 2018-12-04 PROCEDURE — 85610 PROTHROMBIN TIME: CPT

## 2018-12-04 PROCEDURE — 99152 MOD SED SAME PHYS/QHP 5/>YRS: CPT | Performed by: INTERNAL MEDICINE

## 2018-12-04 PROCEDURE — C9113 INJ PANTOPRAZOLE SODIUM, VIA: HCPCS | Performed by: PHYSICIAN ASSISTANT

## 2018-12-04 PROCEDURE — 80048 BASIC METABOLIC PNL TOTAL CA: CPT

## 2018-12-04 PROCEDURE — 85018 HEMOGLOBIN: CPT

## 2018-12-04 PROCEDURE — 6370000000 HC RX 637 (ALT 250 FOR IP): Performed by: INTERNAL MEDICINE

## 2018-12-04 PROCEDURE — 3609012300 HC EGD BAND LIGATION ESOPHGEAL/GASTRIC VARICES: Performed by: INTERNAL MEDICINE

## 2018-12-04 PROCEDURE — 2709999900 HC NON-CHARGEABLE SUPPLY: Performed by: INTERNAL MEDICINE

## 2018-12-04 PROCEDURE — 2580000003 HC RX 258: Performed by: INTERNAL MEDICINE

## 2018-12-04 PROCEDURE — 6370000000 HC RX 637 (ALT 250 FOR IP): Performed by: NURSE PRACTITIONER

## 2018-12-04 PROCEDURE — 85027 COMPLETE CBC AUTOMATED: CPT

## 2018-12-04 PROCEDURE — 80076 HEPATIC FUNCTION PANEL: CPT

## 2018-12-04 PROCEDURE — 36430 TRANSFUSION BLD/BLD COMPNT: CPT

## 2018-12-04 PROCEDURE — 99233 SBSQ HOSP IP/OBS HIGH 50: CPT | Performed by: INTERNAL MEDICINE

## 2018-12-04 PROCEDURE — 06L38CZ OCCLUSION OF ESOPHAGEAL VEIN WITH EXTRALUMINAL DEVICE, VIA NATURAL OR ARTIFICIAL OPENING ENDOSCOPIC: ICD-10-PCS | Performed by: INTERNAL MEDICINE

## 2018-12-04 PROCEDURE — 6360000002 HC RX W HCPCS: Performed by: NURSE PRACTITIONER

## 2018-12-04 PROCEDURE — 85014 HEMATOCRIT: CPT

## 2018-12-04 PROCEDURE — 6360000002 HC RX W HCPCS: Performed by: PHYSICIAN ASSISTANT

## 2018-12-04 PROCEDURE — 2709999900 HC NON-CHARGEABLE SUPPLY

## 2018-12-04 PROCEDURE — 2580000003 HC RX 258: Performed by: PHYSICIAN ASSISTANT

## 2018-12-04 PROCEDURE — 82948 REAGENT STRIP/BLOOD GLUCOSE: CPT

## 2018-12-04 PROCEDURE — 2720000010 HC SURG SUPPLY STERILE: Performed by: INTERNAL MEDICINE

## 2018-12-04 PROCEDURE — 0DJ08ZZ INSPECTION OF UPPER INTESTINAL TRACT, VIA NATURAL OR ARTIFICIAL OPENING ENDOSCOPIC: ICD-10-PCS | Performed by: INTERNAL MEDICINE

## 2018-12-04 PROCEDURE — 36415 COLL VENOUS BLD VENIPUNCTURE: CPT

## 2018-12-04 PROCEDURE — 36592 COLLECT BLOOD FROM PICC: CPT

## 2018-12-04 PROCEDURE — 2580000003 HC RX 258: Performed by: NURSE PRACTITIONER

## 2018-12-04 RX ORDER — 0.9 % SODIUM CHLORIDE 0.9 %
250 INTRAVENOUS SOLUTION INTRAVENOUS ONCE
Status: COMPLETED | OUTPATIENT
Start: 2018-12-04 | End: 2018-12-05

## 2018-12-04 RX ORDER — MIDAZOLAM HYDROCHLORIDE 1 MG/ML
1 INJECTION INTRAMUSCULAR; INTRAVENOUS ONCE
Status: COMPLETED | OUTPATIENT
Start: 2018-12-04 | End: 2018-12-04

## 2018-12-04 RX ORDER — TRAZODONE HYDROCHLORIDE 50 MG/1
50 TABLET ORAL NIGHTLY
Status: DISCONTINUED | OUTPATIENT
Start: 2018-12-04 | End: 2018-12-07 | Stop reason: HOSPADM

## 2018-12-04 RX ORDER — MIDAZOLAM HYDROCHLORIDE 1 MG/ML
INJECTION INTRAMUSCULAR; INTRAVENOUS
Status: DISPENSED
Start: 2018-12-04 | End: 2018-12-05

## 2018-12-04 RX ORDER — DIPHENHYDRAMINE HYDROCHLORIDE 50 MG/ML
25 INJECTION INTRAMUSCULAR; INTRAVENOUS EVERY 6 HOURS PRN
Status: DISCONTINUED | OUTPATIENT
Start: 2018-12-04 | End: 2018-12-06

## 2018-12-04 RX ORDER — 0.9 % SODIUM CHLORIDE 0.9 %
250 INTRAVENOUS SOLUTION INTRAVENOUS ONCE
Status: COMPLETED | OUTPATIENT
Start: 2018-12-04 | End: 2018-12-04

## 2018-12-04 RX ORDER — FENTANYL CITRATE 50 UG/ML
50 INJECTION, SOLUTION INTRAMUSCULAR; INTRAVENOUS ONCE
Status: COMPLETED | OUTPATIENT
Start: 2018-12-04 | End: 2018-12-04

## 2018-12-04 RX ORDER — PHYTONADIONE 5 MG/1
10 TABLET ORAL DAILY
Status: COMPLETED | OUTPATIENT
Start: 2018-12-04 | End: 2018-12-06

## 2018-12-04 RX ADMIN — SODIUM CHLORIDE 250 ML: 9 INJECTION, SOLUTION INTRAVENOUS at 17:45

## 2018-12-04 RX ADMIN — ONDANSETRON 4 MG: 2 INJECTION INTRAMUSCULAR; INTRAVENOUS at 16:54

## 2018-12-04 RX ADMIN — ONDANSETRON 4 MG: 2 INJECTION INTRAMUSCULAR; INTRAVENOUS at 23:34

## 2018-12-04 RX ADMIN — FENTANYL CITRATE 25 MCG: 50 INJECTION INTRAMUSCULAR; INTRAVENOUS at 17:09

## 2018-12-04 RX ADMIN — ONDANSETRON 4 MG: 2 INJECTION INTRAMUSCULAR; INTRAVENOUS at 05:11

## 2018-12-04 RX ADMIN — OCTREOTIDE ACETATE 25 MCG/HR: 100 INJECTION, SOLUTION INTRAVENOUS; SUBCUTANEOUS at 06:36

## 2018-12-04 RX ADMIN — OXYCODONE HYDROCHLORIDE 5 MG: 5 TABLET ORAL at 05:11

## 2018-12-04 RX ADMIN — Medication 10 ML: at 20:35

## 2018-12-04 RX ADMIN — SODIUM CHLORIDE 8 MG/HR: 9 INJECTION, SOLUTION INTRAVENOUS at 13:20

## 2018-12-04 RX ADMIN — DEXAMETHASONE 0.5 MG: 0.5 TABLET ORAL at 10:15

## 2018-12-04 RX ADMIN — FENTANYL CITRATE 25 MCG: 50 INJECTION INTRAMUSCULAR; INTRAVENOUS at 09:42

## 2018-12-04 RX ADMIN — CEFTRIAXONE SODIUM 1 G: 1 INJECTION, POWDER, FOR SOLUTION INTRAMUSCULAR; INTRAVENOUS at 20:35

## 2018-12-04 RX ADMIN — DEXAMETHASONE 0.5 MG: 0.5 TABLET ORAL at 20:35

## 2018-12-04 RX ADMIN — DIPHENHYDRAMINE HYDROCHLORIDE 25 MG: 50 INJECTION, SOLUTION INTRAMUSCULAR; INTRAVENOUS at 14:20

## 2018-12-04 RX ADMIN — RIFAXIMIN 550 MG: 550 TABLET ORAL at 10:15

## 2018-12-04 RX ADMIN — SODIUM CHLORIDE 250 ML: 9 INJECTION, SOLUTION INTRAVENOUS at 09:00

## 2018-12-04 RX ADMIN — PHYTONADIONE 10 MG: 5 TABLET ORAL at 13:26

## 2018-12-04 RX ADMIN — MIDAZOLAM HYDROCHLORIDE 1 MG: 2 INJECTION, SOLUTION INTRAMUSCULAR; INTRAVENOUS at 17:23

## 2018-12-04 RX ADMIN — ONDANSETRON 4 MG: 2 INJECTION INTRAMUSCULAR; INTRAVENOUS at 11:25

## 2018-12-04 RX ADMIN — Medication 10 ML: at 09:00

## 2018-12-04 RX ADMIN — FENTANYL CITRATE 25 MCG: 50 INJECTION INTRAMUSCULAR; INTRAVENOUS at 23:35

## 2018-12-04 RX ADMIN — Medication 10 ML: at 23:37

## 2018-12-04 RX ADMIN — FENTANYL CITRATE 50 MCG: 50 INJECTION, SOLUTION INTRAMUSCULAR; INTRAVENOUS at 17:23

## 2018-12-04 RX ADMIN — OCTREOTIDE ACETATE 25 MCG/HR: 100 INJECTION, SOLUTION INTRAVENOUS; SUBCUTANEOUS at 20:41

## 2018-12-04 RX ADMIN — RIFAXIMIN 550 MG: 550 TABLET ORAL at 20:35

## 2018-12-04 RX ADMIN — OXYCODONE HYDROCHLORIDE 5 MG: 5 TABLET ORAL at 18:42

## 2018-12-04 RX ADMIN — OXYCODONE HYDROCHLORIDE 5 MG: 5 TABLET ORAL at 11:25

## 2018-12-04 ASSESSMENT — PAIN SCALES - GENERAL
PAINLEVEL_OUTOF10: 7
PAINLEVEL_OUTOF10: 7
PAINLEVEL_OUTOF10: 8
PAINLEVEL_OUTOF10: 8
PAINLEVEL_OUTOF10: 7
PAINLEVEL_OUTOF10: 5
PAINLEVEL_OUTOF10: 8

## 2018-12-04 ASSESSMENT — PAIN DESCRIPTION - LOCATION: LOCATION: NECK

## 2018-12-04 ASSESSMENT — PAIN DESCRIPTION - PAIN TYPE: TYPE: ACUTE PAIN

## 2018-12-04 NOTE — CONSULTS
Resp 18   SpO2 100%     CBC:   Recent Labs      12/03/18   1018  12/03/18   1840   WBC  7.9   --    HGB  5.6*  6.3*   HCT  19.4*  20.5*   MCV  107.8*   --    PLT  208   --      BMP:   Recent Labs      12/03/18   1018   NA  137   K  4.2   CL  106   CO2  18*   BUN  22   CREATININE  0.9   CALCIUM  8.2*   GLUCOSE  176*     PT/INR:   Recent Labs      12/03/18   1840   INR  1.37*     APTT: No results for input(s): APTT in the last 72 hours. Lipids:   Recent Labs      12/03/18   1018   ALKPHOS  210*   ALT  <5*   AST  7   BILITOT  1.5*   LABALBU  2.2*   LIPASE  31.7     Troponin: No results for input(s): TROPONINI in the last 72 hours. Hepatic:   Recent Labs      12/03/18   1018   AST  7   ALT  <5*   BILITOT  1.5*   ALKPHOS  210*                     IMAGING  Xr Chest Portable    Result Date: 12/3/2018  PROCEDURE: XR CHEST PORTABLE CLINICAL INFORMATION: central line placement, COMPARISON: 9/25/2018 TECHNIQUE: A single mobile view of the chest was obtained. 1. Poor inflation lungs. Normal heart size. A right jugular line has been inserted with its tip in right atrium. No pneumothorax is seen. 2. Mild bibasilar atelectasis/pneumonia. No effusion is seen. **This report has been created using voice recognition software. It may contain minor errors which are inherent in voice recognition technology. ** Final report electronically signed by Dr. 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    Patient Active Problem List   Diagnosis    SBP (spontaneous bacterial peritonitis) ([address]    Iron deficiency anemia due to chronic blood loss    Severe malnutrition ([address]    Other acute kidney failure (HCC)    Hyponatremia    Hypokalemia    Cir[facility] of liver with ascites [facility]    Sarcoidosis    Acute upper GI bleeding    Hematemesis    Acute blood loss anemia    Other

## 2018-12-04 NOTE — FLOWSHEET NOTE
1350-Pt complains of itching all over. Scratching at rt jugular IV site, and monitor patches. Lotion over entire body. 1415-Continues to complain of generalized itching. States is driving her crazy. Benadryl order received and given as ordered.

## 2018-12-04 NOTE — OP NOTE
PROCEDURE NOTE    DATE OF PROCEDURE: 12/4/2018     ENDOSCOPIST: Fabio Collins MD, MS    ASSISTANT: Natalie    PREOPERATIVE DIAGNOSIS: Hematemesis and Melena    POSTOPERATIVE DIAGNOSIS: -3 columns of grade 3-4 esophageal varices with sign of recent bleeding treated with 6 band ligation . OPERATION: EGD --diagnostic    ANESTHESIA: 1 mg of Versed in increment and 25 mcg of Fentanyl in increment. ESTIMATED BLOOD LOSS:  None    COMPLICATIONS: None. SPECIMENS: were not obtained    HISTORY: The patient is a 46y.o. year old female with history of above preop diagnosis. Esophagogastroduodenoscopy with possible biopsy and dilation Has been recommended. I explained the risk, benefits, expected outcome, and alternatives to the procedure. Risks included but are not limited to bleeding, infection, respiratory distress, hypotension, and perforation of the esophagus, stomach, or duodenum. Patient understands and is in agreement. PROCEDURE: The patient was given monitored anesthesia care. The patient was given oxygen by nasal cannula. The endoscope was inserted orally and advanced under direct vision through the esophagus, through the stomach, through the pylorus, and into the descending duodenum. Findings:  Duodenum:     Descending: normal    Bulb: normal    Stomach:    Antrum: normal    Body: normal    Fundus: normal    Esophagus: three columns of grade 3-4 esophageal varices treated with six band ligation     The scope was removed and the patient tolerated the procedure well.        Electronically signed by Malik Davalos MD  on 12/4/2018 at 5:41 PM   Cc: [facility]

## 2018-12-04 NOTE — PLAN OF CARE
Problem: Falls - Risk of:  Goal: Will remain free from falls  Will remain free from falls   Outcome: Met This Shift  Pt is alert and cooperative with use of call light. Not trying to get out of bed, but very weak, and given pain medication. Frequent checks and hourly rounds to assess needs  Goal: Absence of physical injury  Absence of physical injury   Outcome: Met This Shift      Problem: Risk for Impaired Skin Integrity  Goal: Tissue integrity - skin and mucous membranes  Structural intactness and normal physiological function of skin and  mucous membranes. Outcome: Ongoing  Pt has open slit like tear on buttocks, and fragile skin. Multiple areas of healed scratches, petechiae and bruising. Assist with turning and repositioning every 2 hours and PRN. Ensure heels and elbows off bed. Protective cream to butt due to redness from frequent stools. Problem: Pain:  Goal: Pain level will decrease  Pain level will decrease   Outcome: Ongoing  Pain better controlled today with oxycontin and zofran on board. Occasionally still needs fentanyl. Reassess and medicate as needed  Goal: Control of acute pain  Control of acute pain   Outcome: Ongoing    Goal: Control of chronic pain  Control of chronic pain   Outcome: Ongoing      Problem: Discharge Planning:  Goal: Participates in care planning  Participates in care planning   Outcome: Ongoing  Pt expresses needs at discharge.  in to see and assist with needs. Goal: Discharged to appropriate level of care  Discharged to appropriate level of care   Outcome: Ongoing  Pt plans to return home at discharge, living with daughter and son in law. Problem: Anxiety/Stress:  Goal: Level of anxiety will decrease  Level of anxiety will decrease   Outcome: Ongoing  Pt very anxious at times. Tearful, saying she is sick of being sick. Reassurance given.  Pastoral care visits as needed    Problem: Bleeding:  Goal: Will show no signs and symptoms of excessive bleeding  Will show no signs and symptoms of excessive bleeding   Outcome: Ongoing  H/H stable. No further hematemesis, but having black stools today. Problem: Infection - Central Venous Catheter-Associated Bloodstream Infection:  Goal: Will show no infection signs and symptoms  Will show no infection signs and symptoms   Outcome: Ongoing  Afebrile and WBC within normal. Handle central line with aseptic technique. Monitor temp and labs    Problem: Infection - Methicillin-Resistant Staphylococcus Aureus Infection:  Goal: Absence of methicillin-resistant Staphylococcus aureus infection  Absence of methicillin-resistant Staphylococcus aureus infection   Outcome: Ongoing  Continue to follow contact isolation precaurtions for history of MRSA in ascites fluid    Problem: HEMODYNAMIC STATUS  Goal: Patient has stable vital signs and fluid balance  Outcome: Met This Shift  HR and BP stable. Continue to monitor    Problem: Nutrition  Goal: Optimal nutrition therapy  Outcome: Ongoing  Pt NPO for EGD today. Was taking CL well, but later complained of abdominal pain, and having black stools. Comments: Care plan reviewed with patient. Patient and verbalizes understanding of the plan of care and contribute to goal setting.

## 2018-12-04 NOTE — PROGRESS NOTES
EGD showed esophageal varices treated with six band ligation , will advance to liquid diet for 2 days , continue treatment and GI follow up in 2 weeks.

## 2018-12-04 NOTE — OP NOTE
diphenhydrAMINE (BENADRYL) injection 25 mg, 25 mg, Intravenous, Q6H PRN, RUBEN Gould CNP, 25 mg at 12/04/18 1420    midazolam (VERSED) 2 MG/2ML injection, , , ,     0.9 % sodium chloride bolus, 250 mL, Intravenous, Once, Saturnino Robledo MD    pantoprazole (PROTONIX) 80 mg in sodium chloride 0.9 % 100 mL infusion, 8 mg/hr, Intravenous, Continuous, ROME Quick, Last Rate: 10 mL/hr at 12/04/18 1320, 8 mg/hr at 12/04/18 1320    rifaximin (XIFAXAN) tablet 550 mg, 550 mg, Oral, BID, Israel Arnold MD, 550 mg at 12/04/18 1015    sodium chloride flush 0.9 % injection 10 mL, 10 mL, Intravenous, 2 times per day, Israel Arnold MD, 10 mL at 12/04/18 0900    sodium chloride flush 0.9 % injection 10 mL, 10 mL, Intravenous, PRN, Israel Arnold MD    acetaminophen (TYLENOL) tablet 650 mg, 650 mg, Oral, Q4H PRN, Israel Arnold MD    ondansetron (ZOFRAN) injection 4 mg, 4 mg, Intravenous, Q6H PRN, Israel Arnold MD, 4 mg at 12/04/18 1654    oxyCODONE (ROXICODONE) immediate release tablet 5 mg, 5 mg, Oral, Q6H PRN, RUBEN White CNP, 5 mg at 12/04/18 1125    fentaNYL (SUBLIMAZE) injection 25 mcg, 25 mcg, Intravenous, Q4H PRN, Israel Arnold MD, 25 mcg at 12/04/18 1709    cefTRIAXone (ROCEPHIN) 1 g IVPB in 50 mL D5W minibag, 1 g, Intravenous, Q24H, RUBEN Gould CNP, Stopped at 12/03/18 2039    dexamethasone (DECADRON) tablet 0.5 mg, 0.5 mg, Oral, 2 times per day, RUBEN hWite CNP, 0.5 mg at 12/04/18 1015    glucose (GLUTOSE) 40 % oral gel 15 g, 15 g, Oral, PRN, Israel Arnold MD    dextrose 50 % solution 12.5 g, 12.5 g, Intravenous, PRN, Israel Arnold MD    glucagon (rDNA) injection 1 mg, 1 mg, Intramuscular, PRN, Israel Arnold MD    dextrose 5 % solution, 100 mL/hr, Intravenous, PRN, Israel Arnold MD  Prior to Admission medications    Medication Sig Start Date End Date Taking?  Authorizing Provider   ursodiol (ACTIGALL) 300 MG capsule Take 250 mg by mouth 2 times daily   Yes Historical Provider, MD   lactulose (CHRONULAC) 10 GM/15ML solution Take 30 mLs by mouth 2 times daily 10/3/18  Yes Karoline Turcios MD   rifaximin (XIFAXAN) 550 MG tablet Take 1 tablet by mouth 2 times daily 10/3/18  Yes Karoline Trucios MD   omeprazole (PRILOSEC) 40 MG delayed release capsule Take 40 mg by mouth 3 times daily    Yes Historical Provider, MD   traZODone (DESYREL) 50 MG tablet Take  mg by mouth nightly as needed for Sleep   Yes Historical Provider, MD   metFORMIN (GLUCOPHAGE) 500 MG tablet Take 500 mg by mouth 3 times daily   Yes Historical Provider, MD   ferrous sulfate 325 (65 Fe) MG tablet Take 1 tablet by mouth 2 times daily (with meals) 10/3/18   Karoline Turcios MD   sodium chloride 1 g tablet Take 1 tablet by mouth 3 times daily (with meals) 10/3/18   Karoline Turcios MD   vancomycin (VANCOCIN) infusion Infuse 1,250 mg intravenously daily Compound per protocol. 10/3/18   Laura Field MD   gabapentin (NEURONTIN) 800 MG tablet Take 800 mg by mouth 3 times daily. Marielle Esteves     Historical Provider, MD   ondansetron (ZOFRAN-ODT) 4 MG disintegrating tablet Take 4 mg by mouth every 8 hours as needed for Nausea or Vomiting    Historical Provider, MD     Additional information:       PHYSICAL:   Heart:  [x]Regular rate and rhythm  []Other:    Lungs:  [x]Clear    []Other:    Abdomen: [x]Soft    []Other:    Mental Status: [x]Alert & Oriented  []Other:      VITAL SIGNS   Patient Vitals for the past 24 hrs:   BP Temp Temp src Pulse Resp SpO2 Height Weight   12/04/18 1730 109/69 - - 78 11 100 % - -   12/04/18 1727 106/79 - - 85 14 96 % - -   12/04/18 1726 (!) 104/59 - - 83 20 97 % - -   12/04/18 1329 113/73 98.1 °F (36.7 °C) Oral - - - - -   12/04/18 1209 107/80 97.9 °F (36.6 °C) Oral 79 17 100 % - -   12/04/18 1149 115/73 98.1 °F (36.7 °C) Oral 74 12 - - -   12/04/18 1126 115/73 98.1 °F (36.7 °C) Oral - - - - -   12/04/18 0948 113/76 97.6 °F (36.4 °C) Oral - - - - -   12/04/18 2747

## 2018-12-05 LAB
ANION GAP SERPL CALCULATED.3IONS-SCNC: 13 MEQ/L (ref 8–16)
BUN BLDV-MCNC: 18 MG/DL (ref 7–22)
CALCIUM SERPL-MCNC: 8.3 MG/DL (ref 8.5–10.5)
CHLORIDE BLD-SCNC: 106 MEQ/L (ref 98–111)
CO2: 21 MEQ/L (ref 23–33)
CREAT SERPL-MCNC: 0.7 MG/DL (ref 0.4–1.2)
EKG ATRIAL RATE: 76 BPM
EKG P AXIS: 43 DEGREES
EKG P-R INTERVAL: 160 MS
EKG Q-T INTERVAL: 426 MS
EKG QRS DURATION: 82 MS
EKG QTC CALCULATION (BAZETT): 479 MS
EKG R AXIS: 4 DEGREES
EKG T AXIS: 15 DEGREES
EKG VENTRICULAR RATE: 76 BPM
ERYTHROCYTE [DISTWIDTH] IN BLOOD BY AUTOMATED COUNT: 18.8 % (ref 11.5–14.5)
ERYTHROCYTE [DISTWIDTH] IN BLOOD BY AUTOMATED COUNT: 63.1 FL (ref 35–45)
GFR SERPL CREATININE-BSD FRML MDRD: 87 ML/MIN/1.73M2
GLUCOSE BLD-MCNC: 147 MG/DL (ref 70–108)
GLUCOSE BLD-MCNC: 153 MG/DL (ref 70–108)
GLUCOSE BLD-MCNC: 198 MG/DL (ref 70–108)
HCT VFR BLD CALC: 27 % (ref 37–47)
HCT VFR BLD CALC: 28.3 % (ref 37–47)
HEMOGLOBIN: 8.7 GM/DL (ref 12–16)
HEMOGLOBIN: 9.1 GM/DL (ref 12–16)
MCH RBC QN AUTO: 30.8 PG (ref 26–33)
MCHC RBC AUTO-ENTMCNC: 32.2 GM/DL (ref 32.2–35.5)
MCV RBC AUTO: 95.9 FL (ref 81–99)
MRSA SCREEN: NORMAL
PLATELET # BLD: 204 THOU/MM3 (ref 130–400)
PMV BLD AUTO: 9.9 FL (ref 9.4–12.4)
POTASSIUM SERPL-SCNC: 4 MEQ/L (ref 3.5–5.2)
RBC # BLD: 2.95 MILL/MM3 (ref 4.2–5.4)
SODIUM BLD-SCNC: 140 MEQ/L (ref 135–145)
WBC # BLD: 5.6 THOU/MM3 (ref 4.8–10.8)

## 2018-12-05 PROCEDURE — 85027 COMPLETE CBC AUTOMATED: CPT

## 2018-12-05 PROCEDURE — 6370000000 HC RX 637 (ALT 250 FOR IP): Performed by: INTERNAL MEDICINE

## 2018-12-05 PROCEDURE — 85014 HEMATOCRIT: CPT

## 2018-12-05 PROCEDURE — 2709999900 HC NON-CHARGEABLE SUPPLY

## 2018-12-05 PROCEDURE — APPSS60 APP SPLIT SHARED TIME 46-60 MINUTES: Performed by: NURSE PRACTITIONER

## 2018-12-05 PROCEDURE — 85018 HEMOGLOBIN: CPT

## 2018-12-05 PROCEDURE — 2580000003 HC RX 258: Performed by: PHYSICIAN ASSISTANT

## 2018-12-05 PROCEDURE — 93010 ELECTROCARDIOGRAM REPORT: CPT | Performed by: NUCLEAR MEDICINE

## 2018-12-05 PROCEDURE — 36415 COLL VENOUS BLD VENIPUNCTURE: CPT

## 2018-12-05 PROCEDURE — 2580000003 HC RX 258: Performed by: NURSE PRACTITIONER

## 2018-12-05 PROCEDURE — 99233 SBSQ HOSP IP/OBS HIGH 50: CPT | Performed by: INTERNAL MEDICINE

## 2018-12-05 PROCEDURE — 6370000000 HC RX 637 (ALT 250 FOR IP): Performed by: NURSE PRACTITIONER

## 2018-12-05 PROCEDURE — 6360000002 HC RX W HCPCS: Performed by: NURSE PRACTITIONER

## 2018-12-05 PROCEDURE — 93005 ELECTROCARDIOGRAM TRACING: CPT | Performed by: NURSE PRACTITIONER

## 2018-12-05 PROCEDURE — 82948 REAGENT STRIP/BLOOD GLUCOSE: CPT

## 2018-12-05 PROCEDURE — 1200000000 HC SEMI PRIVATE

## 2018-12-05 PROCEDURE — 80048 BASIC METABOLIC PNL TOTAL CA: CPT

## 2018-12-05 PROCEDURE — 6360000002 HC RX W HCPCS: Performed by: PHYSICIAN ASSISTANT

## 2018-12-05 PROCEDURE — 2580000003 HC RX 258: Performed by: INTERNAL MEDICINE

## 2018-12-05 PROCEDURE — C9113 INJ PANTOPRAZOLE SODIUM, VIA: HCPCS | Performed by: PHYSICIAN ASSISTANT

## 2018-12-05 PROCEDURE — 6360000002 HC RX W HCPCS: Performed by: INTERNAL MEDICINE

## 2018-12-05 PROCEDURE — APPSS180 APP SPLIT SHARED TIME > 60 MINUTES: Performed by: NURSE PRACTITIONER

## 2018-12-05 PROCEDURE — 36592 COLLECT BLOOD FROM PICC: CPT

## 2018-12-05 RX ORDER — PANTOPRAZOLE SODIUM 40 MG/1
40 TABLET, DELAYED RELEASE ORAL
Status: DISCONTINUED | OUTPATIENT
Start: 2018-12-06 | End: 2018-12-07 | Stop reason: HOSPADM

## 2018-12-05 RX ADMIN — OXYCODONE HYDROCHLORIDE 5 MG: 5 TABLET ORAL at 01:00

## 2018-12-05 RX ADMIN — DEXAMETHASONE 0.5 MG: 0.5 TABLET ORAL at 09:48

## 2018-12-05 RX ADMIN — DIPHENHYDRAMINE HYDROCHLORIDE 25 MG: 50 INJECTION, SOLUTION INTRAMUSCULAR; INTRAVENOUS at 19:43

## 2018-12-05 RX ADMIN — PHYTONADIONE 10 MG: 5 TABLET ORAL at 09:48

## 2018-12-05 RX ADMIN — RIFAXIMIN 550 MG: 550 TABLET ORAL at 21:27

## 2018-12-05 RX ADMIN — CEFTRIAXONE SODIUM 1 G: 1 INJECTION, POWDER, FOR SOLUTION INTRAMUSCULAR; INTRAVENOUS at 21:27

## 2018-12-05 RX ADMIN — LIDOCAINE HYDROCHLORIDE: 20 SOLUTION ORAL; TOPICAL at 09:51

## 2018-12-05 RX ADMIN — SODIUM CHLORIDE 8 MG/HR: 9 INJECTION, SOLUTION INTRAVENOUS at 00:38

## 2018-12-05 RX ADMIN — Medication 10 ML: at 05:02

## 2018-12-05 RX ADMIN — FENTANYL CITRATE 25 MCG: 50 INJECTION INTRAMUSCULAR; INTRAVENOUS at 05:01

## 2018-12-05 RX ADMIN — TRAZODONE HYDROCHLORIDE 50 MG: 50 TABLET ORAL at 02:30

## 2018-12-05 RX ADMIN — SODIUM CHLORIDE 8 MG/HR: 9 INJECTION, SOLUTION INTRAVENOUS at 11:32

## 2018-12-05 RX ADMIN — RIFAXIMIN 550 MG: 550 TABLET ORAL at 09:47

## 2018-12-05 RX ADMIN — Medication 10 ML: at 21:27

## 2018-12-05 RX ADMIN — Medication 10 ML: at 09:53

## 2018-12-05 RX ADMIN — DEXAMETHASONE 0.5 MG: 0.5 TABLET ORAL at 21:27

## 2018-12-05 RX ADMIN — FENTANYL CITRATE 25 MCG: 50 INJECTION INTRAMUSCULAR; INTRAVENOUS at 18:52

## 2018-12-05 RX ADMIN — OXYCODONE HYDROCHLORIDE 5 MG: 5 TABLET ORAL at 07:30

## 2018-12-05 RX ADMIN — OXYCODONE HYDROCHLORIDE 5 MG: 5 TABLET ORAL at 19:43

## 2018-12-05 RX ADMIN — LIDOCAINE HYDROCHLORIDE: 20 SOLUTION ORAL; TOPICAL at 02:24

## 2018-12-05 RX ADMIN — Medication 10 ML: at 13:39

## 2018-12-05 RX ADMIN — DIPHENHYDRAMINE HYDROCHLORIDE 25 MG: 50 INJECTION, SOLUTION INTRAMUSCULAR; INTRAVENOUS at 13:38

## 2018-12-05 RX ADMIN — OXYCODONE HYDROCHLORIDE 5 MG: 5 TABLET ORAL at 13:38

## 2018-12-05 ASSESSMENT — PAIN SCALES - GENERAL
PAINLEVEL_OUTOF10: 8
PAINLEVEL_OUTOF10: 5
PAINLEVEL_OUTOF10: 5
PAINLEVEL_OUTOF10: 8
PAINLEVEL_OUTOF10: 5
PAINLEVEL_OUTOF10: 7
PAINLEVEL_OUTOF10: 5
PAINLEVEL_OUTOF10: 7

## 2018-12-05 ASSESSMENT — PAIN DESCRIPTION - ORIENTATION
ORIENTATION: RIGHT

## 2018-12-05 ASSESSMENT — PAIN DESCRIPTION - LOCATION
LOCATION: ABDOMEN;NECK
LOCATION: ABDOMEN;NECK
LOCATION: ABDOMEN
LOCATION: ABDOMEN;NECK
LOCATION: ABDOMEN;NECK

## 2018-12-05 ASSESSMENT — PAIN DESCRIPTION - PAIN TYPE
TYPE: ACUTE PAIN

## 2018-12-05 ASSESSMENT — PAIN DESCRIPTION - DESCRIPTORS
DESCRIPTORS: ACHING;DISCOMFORT

## 2018-12-05 NOTE — PROGRESS NOTES
Gastroenterology  Progress Note    12/5/2018 1:49 PM  Subjective:   Admit Date: 12/3/2018    Interval History: Doing well , no bleeding tolerating po well HB 9.1  Diet: Dietary Nutrition Supplements: Diabetic Oral Supplement  DIET CLEAR LIQUID;    Medications:   Scheduled Meds:   [START ON 12/6/2018] pantoprazole  40 mg Oral QAM AC    phytonadione  10 mg Oral Daily    traZODone  50 mg Oral Nightly    rifaximin  550 mg Oral BID    sodium chloride flush  10 mL Intravenous 2 times per day    cefTRIAXone (ROCEPHIN) IV  1 g Intravenous Q24H    dexamethasone  0.5 mg Oral 2 times per day     Continuous Infusions:   dextrose       CBC:   Recent Labs      12/03/18   1018   12/04/18   0454   12/04/18   2030  12/05/18   0134  12/05/18   0620   WBC  7.9   --   6.8   --    --    --   5.6   HGB  5.6*   < >  8.0*   < >  8.3*  8.7*  9.1*   PLT  208   --   182   --    --    --   204    < > = values in this interval not displayed.      BMP:  Recent Labs      12/03/18   1018  12/04/18   0454  12/05/18   0620   NA  137  140  140   K  4.2  3.7  4.0   CL  106  108  106   CO2  18*  22*  21*   BUN  22  25*  18   CREATININE  0.9  0.6  0.7   GLUCOSE  176*  116*  153*     Hepatic: Recent Labs      12/03/18   1018  12/04/18   0454   AST  7  9   ALT  <5*  6*   BILITOT  1.5*  1.6*   ALKPHOS  210*  145*     INR:   Recent Labs      12/03/18   1840  12/04/18   0454   INR  1.37*  1.29*     Xray: same  Endoscopy Finding:  Bleeding esophageal varices treated with band ligation     Objective:   Vitals: /70   Pulse 72   Temp 98.5 °F (36.9 °C) (Oral)   Resp 16   Ht 5' 4\" (1.626 m)   Wt 173 lb 1 oz (78.5 kg)   SpO2 100%   BMI 29.71 kg/m²     Intake/Output Summary (Last 24 hours) at 12/05/18 1349  Last data filed at 12/05/18 0800   Gross per 24 hour   Intake             3994 ml   Output             1100 ml   Net             2894 ml     General appearance: alert and cooperative with exam  Lungs: clear to auscultation

## 2018-12-05 NOTE — PROGRESS NOTES
12/03/18   1018  12/04/18   0454   AST  7  9   ALT  <5*  6*   BILITOT  1.5*  1.6*   ALKPHOS  210*  145*     INR:   Recent Labs      12/03/18   1840  12/04/18   0454   INR  1.37*  1.29*     HgBA1c:    Lab Results   Component Value Date    LABA1C 7.6 09/27/2018     IRON:    Lab Results   Component Value Date    IRON 15 09/28/2018         Assessment and Plan:   1. Hematemesis / UGI bleed, s/p PRBC transfusion x 4 units so far  2. Liver cirrhosis with portal HTN / esophageal varices,treated with band ligation  3. Mild coagulopathy ass with CLD, on vit K  4. Ascites ass with above, s/p paracentesis, no SBP  5. Sarcoidosis  6. DM 2  7. CAD s/p PTCI     Cont ISIDRA protonix,   Amlabs.   PT/OT  Transfer out of ICU    Tu Jara MD

## 2018-12-05 NOTE — PLAN OF CARE
Problem: Falls - Risk of:  Goal: Will remain free from falls  Will remain free from falls   Outcome: Ongoing  Fall prevention measures in place and explained to patient. Call light within reach, bed in low position, bed alarm on. No falls at this time. Goal: Absence of physical injury  Absence of physical injury   Outcome: Ongoing  Safety measures in place and explained to patient. No evidence of injury at this time. Problem: Risk for Impaired Skin Integrity  Goal: Tissue integrity - skin and mucous membranes  Structural intactness and normal physiological function of skin and  mucous membranes. Outcome: Ongoing  Areas of impaired skin integrity in various stages of healing. Bruising throughout - patient states she bruises easily. Patient able to turn self without difficulty. Problem: Pain:  Goal: Pain level will decrease  Pain level will decrease   Outcome: Ongoing  Patient continues to report pain in abdomen and at site of central line. Continues to report \"heartburn like\" sensation. Medications given per orders. Continue to monitor. Goal: Control of acute pain  Control of acute pain   Outcome: Ongoing    Goal: Control of chronic pain  Control of chronic pain   Outcome: Ongoing      Problem: Discharge Planning:  Goal: Participates in care planning  Participates in care planning   Outcome: Ongoing  Alert and oriented x4. Participates actively in care planning. Goal: Discharged to appropriate level of care  Discharged to appropriate level of care   Outcome: Not Met This Shift  Continues to require ICU level care. Discharge planning continues. Problem: Anxiety/Stress:  Goal: Level of anxiety will decrease  Level of anxiety will decrease   Outcome: Ongoing      Problem: Bleeding:  Goal: Will show no signs and symptoms of excessive bleeding  Will show no signs and symptoms of excessive bleeding   Outcome: Ongoing  Small amount of black/tarry stool overnight.  H&H stable at this time without

## 2018-12-06 LAB
ANION GAP SERPL CALCULATED.3IONS-SCNC: 10 MEQ/L (ref 8–16)
BUN BLDV-MCNC: 14 MG/DL (ref 7–22)
CALCIUM SERPL-MCNC: 8 MG/DL (ref 8.5–10.5)
CHLORIDE BLD-SCNC: 107 MEQ/L (ref 98–111)
CO2: 23 MEQ/L (ref 23–33)
CREAT SERPL-MCNC: 0.6 MG/DL (ref 0.4–1.2)
ERYTHROCYTE [DISTWIDTH] IN BLOOD BY AUTOMATED COUNT: 19 % (ref 11.5–14.5)
ERYTHROCYTE [DISTWIDTH] IN BLOOD BY AUTOMATED COUNT: 64.1 FL (ref 35–45)
GFR SERPL CREATININE-BSD FRML MDRD: > 90 ML/MIN/1.73M2
GLUCOSE BLD-MCNC: 123 MG/DL (ref 70–108)
GLUCOSE BLD-MCNC: 129 MG/DL (ref 70–108)
GLUCOSE BLD-MCNC: 160 MG/DL (ref 70–108)
HCT VFR BLD CALC: 27.3 % (ref 37–47)
HEMOGLOBIN: 8.6 GM/DL (ref 12–16)
INR BLD: 1.08 (ref 0.85–1.13)
MCH RBC QN AUTO: 30.5 PG (ref 26–33)
MCHC RBC AUTO-ENTMCNC: 31.5 GM/DL (ref 32.2–35.5)
MCV RBC AUTO: 96.8 FL (ref 81–99)
PLATELET # BLD: 174 THOU/MM3 (ref 130–400)
PMV BLD AUTO: 9.2 FL (ref 9.4–12.4)
POTASSIUM SERPL-SCNC: 3.9 MEQ/L (ref 3.5–5.2)
RBC # BLD: 2.82 MILL/MM3 (ref 4.2–5.4)
SODIUM BLD-SCNC: 140 MEQ/L (ref 135–145)
WBC # BLD: 5.1 THOU/MM3 (ref 4.8–10.8)

## 2018-12-06 PROCEDURE — 6370000000 HC RX 637 (ALT 250 FOR IP): Performed by: INTERNAL MEDICINE

## 2018-12-06 PROCEDURE — 2580000003 HC RX 258: Performed by: INTERNAL MEDICINE

## 2018-12-06 PROCEDURE — 87205 SMEAR GRAM STAIN: CPT

## 2018-12-06 PROCEDURE — 82948 REAGENT STRIP/BLOOD GLUCOSE: CPT

## 2018-12-06 PROCEDURE — 80048 BASIC METABOLIC PNL TOTAL CA: CPT

## 2018-12-06 PROCEDURE — 36592 COLLECT BLOOD FROM PICC: CPT

## 2018-12-06 PROCEDURE — 85027 COMPLETE CBC AUTOMATED: CPT

## 2018-12-06 PROCEDURE — 6360000002 HC RX W HCPCS: Performed by: NURSE PRACTITIONER

## 2018-12-06 PROCEDURE — 2709999900 HC NON-CHARGEABLE SUPPLY

## 2018-12-06 PROCEDURE — 2580000003 HC RX 258: Performed by: NURSE PRACTITIONER

## 2018-12-06 PROCEDURE — 6360000002 HC RX W HCPCS: Performed by: INTERNAL MEDICINE

## 2018-12-06 PROCEDURE — 36415 COLL VENOUS BLD VENIPUNCTURE: CPT

## 2018-12-06 PROCEDURE — 6370000000 HC RX 637 (ALT 250 FOR IP): Performed by: NURSE PRACTITIONER

## 2018-12-06 PROCEDURE — 85610 PROTHROMBIN TIME: CPT

## 2018-12-06 PROCEDURE — 1200000000 HC SEMI PRIVATE

## 2018-12-06 RX ORDER — DIPHENHYDRAMINE HCL 25 MG
25 TABLET ORAL EVERY 8 HOURS PRN
Status: DISCONTINUED | OUTPATIENT
Start: 2018-12-06 | End: 2018-12-07 | Stop reason: HOSPADM

## 2018-12-06 RX ADMIN — DEXAMETHASONE 0.5 MG: 0.5 TABLET ORAL at 20:56

## 2018-12-06 RX ADMIN — OXYCODONE HYDROCHLORIDE 5 MG: 5 TABLET ORAL at 07:59

## 2018-12-06 RX ADMIN — Medication 10 ML: at 20:58

## 2018-12-06 RX ADMIN — DIPHENHYDRAMINE HYDROCHLORIDE 25 MG: 50 INJECTION, SOLUTION INTRAMUSCULAR; INTRAVENOUS at 01:24

## 2018-12-06 RX ADMIN — DEXAMETHASONE 0.5 MG: 0.5 TABLET ORAL at 07:59

## 2018-12-06 RX ADMIN — TRAZODONE HYDROCHLORIDE 50 MG: 50 TABLET ORAL at 20:56

## 2018-12-06 RX ADMIN — DIPHENHYDRAMINE HYDROCHLORIDE 25 MG: 50 INJECTION, SOLUTION INTRAMUSCULAR; INTRAVENOUS at 07:57

## 2018-12-06 RX ADMIN — PANTOPRAZOLE SODIUM 40 MG: 40 TABLET, DELAYED RELEASE ORAL at 06:53

## 2018-12-06 RX ADMIN — OXYCODONE HYDROCHLORIDE 5 MG: 5 TABLET ORAL at 16:56

## 2018-12-06 RX ADMIN — RIFAXIMIN 550 MG: 550 TABLET ORAL at 07:57

## 2018-12-06 RX ADMIN — Medication 10 ML: at 07:58

## 2018-12-06 RX ADMIN — OXYCODONE HYDROCHLORIDE 5 MG: 5 TABLET ORAL at 01:24

## 2018-12-06 RX ADMIN — RIFAXIMIN 550 MG: 550 TABLET ORAL at 20:56

## 2018-12-06 RX ADMIN — OXYCODONE HYDROCHLORIDE 5 MG: 5 TABLET ORAL at 22:49

## 2018-12-06 RX ADMIN — FENTANYL CITRATE 25 MCG: 50 INJECTION INTRAMUSCULAR; INTRAVENOUS at 14:05

## 2018-12-06 RX ADMIN — ACETAMINOPHEN 650 MG: 325 TABLET ORAL at 20:46

## 2018-12-06 RX ADMIN — PHYTONADIONE 10 MG: 5 TABLET ORAL at 07:57

## 2018-12-06 RX ADMIN — TRAZODONE HYDROCHLORIDE 50 MG: 50 TABLET ORAL at 01:24

## 2018-12-06 RX ADMIN — DIPHENHYDRAMINE HYDROCHLORIDE 25 MG: 50 INJECTION, SOLUTION INTRAMUSCULAR; INTRAVENOUS at 16:56

## 2018-12-06 ASSESSMENT — PAIN DESCRIPTION - LOCATION
LOCATION: ABDOMEN;NECK

## 2018-12-06 ASSESSMENT — PAIN SCALES - GENERAL
PAINLEVEL_OUTOF10: 8
PAINLEVEL_OUTOF10: 5
PAINLEVEL_OUTOF10: 9
PAINLEVEL_OUTOF10: 7
PAINLEVEL_OUTOF10: 0
PAINLEVEL_OUTOF10: 7
PAINLEVEL_OUTOF10: 0
PAINLEVEL_OUTOF10: 5
PAINLEVEL_OUTOF10: 7
PAINLEVEL_OUTOF10: 10
PAINLEVEL_OUTOF10: 5
PAINLEVEL_OUTOF10: 7
PAINLEVEL_OUTOF10: 8
PAINLEVEL_OUTOF10: 7

## 2018-12-06 ASSESSMENT — PAIN DESCRIPTION - PAIN TYPE
TYPE: ACUTE PAIN

## 2018-12-06 ASSESSMENT — PAIN DESCRIPTION - ORIENTATION
ORIENTATION: RIGHT

## 2018-12-06 ASSESSMENT — PAIN DESCRIPTION - DESCRIPTORS
DESCRIPTORS: ACHING;DISCOMFORT

## 2018-12-06 NOTE — FLOWSHEET NOTE
Left message with wound/ostomy in regards to placing stoma bag over paracentesis site as it continues to leak.

## 2018-12-07 ENCOUNTER — APPOINTMENT (OUTPATIENT)
Dept: ULTRASOUND IMAGING | Age: 53
DRG: 432 | End: 2018-12-07
Payer: COMMERCIAL

## 2018-12-07 VITALS
SYSTOLIC BLOOD PRESSURE: 129 MMHG | BODY MASS INDEX: 29.55 KG/M2 | HEIGHT: 64 IN | TEMPERATURE: 98.3 F | WEIGHT: 173.06 LBS | RESPIRATION RATE: 20 BRPM | OXYGEN SATURATION: 100 % | HEART RATE: 78 BPM | DIASTOLIC BLOOD PRESSURE: 65 MMHG

## 2018-12-07 PROBLEM — I85.00 VARICES OF ESOPHAGUS DETERMINED BY ENDOSCOPY (HCC): Status: ACTIVE | Noted: 2018-12-07

## 2018-12-07 PROBLEM — I85.11 SECONDARY ESOPHAGEAL VARICES WITH BLEEDING (HCC): Status: ACTIVE | Noted: 2018-12-07

## 2018-12-07 LAB
ALBUMIN FLUID: 0.4 GM/DL
AMYLASE FLUID: 5 U/L
BODY FLUID RBC: < 2000 /CUMM
CHARACTER, BODY FLUID: NORMAL
COLOR: YELLOW
GLUCOSE BLD-MCNC: 111 MG/DL (ref 70–108)
GLUCOSE BLD-MCNC: 121 MG/DL (ref 70–108)
MONONUCLEAR CELLS BODY FLUID: 89.3 %
PATHOLOGIST REVIEW: NORMAL
POLYMORPHONUCLEAR CELLS BODY FLUID: 10.7 %
SPECIMEN: NORMAL
TOTAL NUCLEATED CELLS BODY FLUID: 76 /CUMM (ref 0–500)
TOTAL VOLUME RECEIVED BODY FLUID: 60 ML

## 2018-12-07 PROCEDURE — 6370000000 HC RX 637 (ALT 250 FOR IP): Performed by: INTERNAL MEDICINE

## 2018-12-07 PROCEDURE — 82042 OTHER SOURCE ALBUMIN QUAN EA: CPT

## 2018-12-07 PROCEDURE — C1751 CATH, INF, PER/CENT/MIDLINE: HCPCS

## 2018-12-07 PROCEDURE — 88305 TISSUE EXAM BY PATHOLOGIST: CPT

## 2018-12-07 PROCEDURE — C1729 CATH, DRAINAGE: HCPCS

## 2018-12-07 PROCEDURE — 6360000002 HC RX W HCPCS: Performed by: NURSE PRACTITIONER

## 2018-12-07 PROCEDURE — 2580000003 HC RX 258: Performed by: NURSE PRACTITIONER

## 2018-12-07 PROCEDURE — 2709999900 HC NON-CHARGEABLE SUPPLY

## 2018-12-07 PROCEDURE — 2580000003 HC RX 258: Performed by: INTERNAL MEDICINE

## 2018-12-07 PROCEDURE — 82948 REAGENT STRIP/BLOOD GLUCOSE: CPT

## 2018-12-07 PROCEDURE — 6370000000 HC RX 637 (ALT 250 FOR IP): Performed by: NURSE PRACTITIONER

## 2018-12-07 PROCEDURE — 0W9G3ZZ DRAINAGE OF PERITONEAL CAVITY, PERCUTANEOUS APPROACH: ICD-10-PCS | Performed by: RADIOLOGY

## 2018-12-07 PROCEDURE — 87070 CULTURE OTHR SPECIMN AEROBIC: CPT

## 2018-12-07 PROCEDURE — 88112 CYTOPATH CELL ENHANCE TECH: CPT

## 2018-12-07 PROCEDURE — 49083 ABD PARACENTESIS W/IMAGING: CPT

## 2018-12-07 PROCEDURE — 82150 ASSAY OF AMYLASE: CPT

## 2018-12-07 PROCEDURE — 89050 BODY FLUID CELL COUNT: CPT

## 2018-12-07 PROCEDURE — 87075 CULTR BACTERIA EXCEPT BLOOD: CPT

## 2018-12-07 RX ORDER — FERROUS SULFATE 325(65) MG
325 TABLET ORAL 2 TIMES DAILY WITH MEALS
Qty: 60 TABLET | Refills: 3 | Status: SHIPPED | OUTPATIENT
Start: 2018-12-07

## 2018-12-07 RX ORDER — ATENOLOL 25 MG/1
25 TABLET ORAL DAILY
Qty: 30 TABLET | Refills: 3 | Status: SHIPPED | OUTPATIENT
Start: 2018-12-07

## 2018-12-07 RX ORDER — LACTULOSE 10 G/15ML
20 SOLUTION ORAL 2 TIMES DAILY
Qty: 1800 ML | Refills: 1 | Status: SHIPPED | OUTPATIENT
Start: 2018-12-07

## 2018-12-07 RX ORDER — URSODIOL 300 MG/1
250 CAPSULE ORAL 2 TIMES DAILY
Qty: 60 CAPSULE | Refills: 3 | Status: SHIPPED | OUTPATIENT
Start: 2018-12-07

## 2018-12-07 RX ORDER — ONDANSETRON 4 MG/1
4 TABLET, ORALLY DISINTEGRATING ORAL EVERY 8 HOURS PRN
Qty: 40 TABLET | Refills: 2 | Status: SHIPPED | OUTPATIENT
Start: 2018-12-07

## 2018-12-07 RX ORDER — GABAPENTIN 800 MG/1
800 TABLET ORAL 3 TIMES DAILY
Qty: 90 TABLET | Refills: 3 | Status: SHIPPED | OUTPATIENT
Start: 2018-12-07 | End: 2018-12-10

## 2018-12-07 RX ADMIN — OXYCODONE HYDROCHLORIDE 5 MG: 5 TABLET ORAL at 12:30

## 2018-12-07 RX ADMIN — DIPHENHYDRAMINE HCL 25 MG: 25 TABLET ORAL at 00:09

## 2018-12-07 RX ADMIN — RIFAXIMIN 550 MG: 550 TABLET ORAL at 09:08

## 2018-12-07 RX ADMIN — PANTOPRAZOLE SODIUM 40 MG: 40 TABLET, DELAYED RELEASE ORAL at 09:08

## 2018-12-07 RX ADMIN — OXYCODONE HYDROCHLORIDE 5 MG: 5 TABLET ORAL at 06:22

## 2018-12-07 RX ADMIN — DEXAMETHASONE 0.5 MG: 0.5 TABLET ORAL at 12:09

## 2018-12-07 RX ADMIN — CEFTRIAXONE SODIUM 1 G: 1 INJECTION, POWDER, FOR SOLUTION INTRAMUSCULAR; INTRAVENOUS at 00:10

## 2018-12-07 RX ADMIN — Medication 10 ML: at 12:10

## 2018-12-07 ASSESSMENT — PAIN SCALES - GENERAL
PAINLEVEL_OUTOF10: 7
PAINLEVEL_OUTOF10: 5
PAINLEVEL_OUTOF10: 7

## 2018-12-07 NOTE — PLAN OF CARE
infection   Outcome: Ongoing  Patient is still in isolation. Will continue to monitor. Comments: Care plan reviewed with patient. Patient verbalize understanding of the plan of care and contribute to goal setting.

## 2018-12-07 NOTE — PROGRESS NOTES
Primary RN called and stated that the physician ordered an ostomy pouching system to be placed on a leaking peritoneal site. Primary RN stated she applied the pouching system and drainage appears to be slowing down and is intact. Will continue to follow. Left extra pouching system in room.

## 2018-12-07 NOTE — PLAN OF CARE
Problem: Nutrition  Goal: Optimal nutrition therapy  Outcome: Ongoing  Nutrition Problem: Moderate malnutrition  Intervention: Food and/or Nutrient Delivery: Continue current diet, Continue current ONS (Recommend a Multivitamin w/minerals daily.  Continue Glucerna TID)  Nutritional Goals: Pt will consume 75% or more of meals during LOS

## 2018-12-07 NOTE — PROGRESS NOTES
INTERNAL MEDICINE Progress Note  12/7/2018 1:49 PM  Subjective:   Admit Date: 12/3/2018  PCP: Mirta Poole MD  Interval History:   Patient had 4 units PRBC total,    Had 5 L paracentesis 12/3/18, 4 L paracentesis 12/7/18,  fluid negative for sbp,    egd 12/4/18: Duodenum: Descending: normal,  Bulb: normal   Stomach:  Antrum: normal  Body: normal  Fundus: normal   Esophagus: three columns of grade 3-4 esophageal varices treated with six band ligation       Objective:   Vitals: /65   Pulse 78   Temp 98.3 °F (36.8 °C) (Oral)   Resp 20   Ht 5' 4\" (1.626 m)   Wt 173 lb 1 oz (78.5 kg)   SpO2 100%   BMI 29.71 kg/m²   General appearance: alert and cooperative with exam  HEENT:  atraumatic  Neck:  no JVD  Lungs: clear to auscultation bilaterally  Heart: S1, S2 normal  Abdomen: normal findings: bowel sounds normal and abnormal findings:  ascites and distended, leakage from paracentesis site  Extremities: trace edema legs   Neurologic: Alert, oriented, thought content appropriate, no focal weakness, no flapping tremor      Medications:   Scheduled Meds:   pantoprazole  40 mg Oral QAM AC    traZODone  50 mg Oral Nightly    rifaximin  550 mg Oral BID    sodium chloride flush  10 mL Intravenous 2 times per day    cefTRIAXone (ROCEPHIN) IV  1 g Intravenous Q24H    dexamethasone  0.5 mg Oral 2 times per day     Continuous Infusions:   dextrose         Lab Results:   CBC:   Recent Labs      12/05/18   0134  12/05/18   0620  12/06/18   0443   WBC   --   5.6  5.1   HGB  8.7*  9.1*  8.6*   PLT   --   204  174     BMP:    Recent Labs      12/05/18   0620  12/06/18   0443   NA  140  140   K  4.0  3.9   CL  106  107   CO2  21*  23   BUN  18  14   CREATININE  0.7  0.6   GLUCOSE  153*  129*     INR:   Recent Labs      12/06/18   0443   INR  1.08     HgBA1c:    Lab Results   Component Value Date    LABA1C 7.6 09/27/2018     IRON:    Lab Results   Component Value Date    IRON 15 09/28/2018         Assessment and Plan: 1. Hematemesis / UGI bleed, s/p PRBC transfusion x 4 units   2. Liver cirrhosis with portal HTN / esophageal varices,treated with band ligation  3. Mild coagulopathy ass with CLD, on vit K  4. Ascites ass with above, s/p paracentesis x2, no SBP  5. Sarcoidosis  6. DM 2  7. CAD s/p PTCI     Cont po protonix,   Tolerating diet. Stable for dc home. GI f/up as OP.     Flo Harman MD

## 2018-12-08 LAB
ANAEROBIC CULTURE: NORMAL
BODY FLUID CULTURE, STERILE: NORMAL
GRAM STAIN RESULT: NORMAL

## 2018-12-12 LAB
ANAEROBIC CULTURE: NORMAL
BODY FLUID CULTURE, STERILE: NORMAL
GRAM STAIN RESULT: NORMAL

## (undated) DEVICE — LIGATOR ENDOSCP DIA8.6-11.5MM MULT DISP SPDBND LIGATOR SUP

## (undated) DEVICE — CONMED SCOPE SAVER BITE BLOCK, 20X27 MM: Brand: SCOPE SAVER